# Patient Record
Sex: FEMALE | Race: ASIAN | NOT HISPANIC OR LATINO | URBAN - METROPOLITAN AREA
[De-identification: names, ages, dates, MRNs, and addresses within clinical notes are randomized per-mention and may not be internally consistent; named-entity substitution may affect disease eponyms.]

---

## 2020-09-16 ENCOUNTER — EMERGENCY (EMERGENCY)
Facility: HOSPITAL | Age: 45
LOS: 1 days | Discharge: HOME | End: 2020-09-16
Attending: EMERGENCY MEDICINE
Payer: COMMERCIAL

## 2020-09-16 VITALS
HEART RATE: 66 BPM | OXYGEN SATURATION: 100 % | SYSTOLIC BLOOD PRESSURE: 112 MMHG | DIASTOLIC BLOOD PRESSURE: 69 MMHG | RESPIRATION RATE: 18 BRPM | TEMPERATURE: 98 F

## 2020-09-16 VITALS
HEART RATE: 78 BPM | WEIGHT: 128.09 LBS | SYSTOLIC BLOOD PRESSURE: 126 MMHG | TEMPERATURE: 97 F | RESPIRATION RATE: 18 BRPM | DIASTOLIC BLOOD PRESSURE: 74 MMHG | OXYGEN SATURATION: 99 %

## 2020-09-16 DIAGNOSIS — Z90.710 ACQUIRED ABSENCE OF BOTH CERVIX AND UTERUS: Chronic | ICD-10-CM

## 2020-09-16 LAB
ALBUMIN SERPL ELPH-MCNC: 4 G/DL — SIGNIFICANT CHANGE UP (ref 3.5–5)
ALP SERPL-CCNC: 79 U/L — SIGNIFICANT CHANGE UP (ref 40–120)
ALT FLD-CCNC: 27 U/L DA — SIGNIFICANT CHANGE UP (ref 10–60)
ANION GAP SERPL CALC-SCNC: 8 MMOL/L — SIGNIFICANT CHANGE UP (ref 5–17)
APTT BLD: 30.9 SEC — SIGNIFICANT CHANGE UP (ref 27.5–35.5)
AST SERPL-CCNC: 43 U/L — HIGH (ref 10–40)
BASOPHILS # BLD AUTO: 0.02 K/UL — SIGNIFICANT CHANGE UP (ref 0–0.2)
BASOPHILS NFR BLD AUTO: 0.3 % — SIGNIFICANT CHANGE UP (ref 0–2)
BILIRUB SERPL-MCNC: 0.4 MG/DL — SIGNIFICANT CHANGE UP (ref 0.2–1.2)
BUN SERPL-MCNC: 20 MG/DL — HIGH (ref 7–18)
CALCIUM SERPL-MCNC: 8.6 MG/DL — SIGNIFICANT CHANGE UP (ref 8.4–10.5)
CHLORIDE SERPL-SCNC: 111 MMOL/L — HIGH (ref 96–108)
CO2 SERPL-SCNC: 21 MMOL/L — LOW (ref 22–31)
CREAT SERPL-MCNC: 1.27 MG/DL — SIGNIFICANT CHANGE UP (ref 0.5–1.3)
EOSINOPHIL # BLD AUTO: 0.24 K/UL — SIGNIFICANT CHANGE UP (ref 0–0.5)
EOSINOPHIL NFR BLD AUTO: 4 % — SIGNIFICANT CHANGE UP (ref 0–6)
GLUCOSE SERPL-MCNC: 109 MG/DL — HIGH (ref 70–99)
HCT VFR BLD CALC: 27.4 % — LOW (ref 34.5–45)
HGB BLD-MCNC: 7.4 G/DL — LOW (ref 11.5–15.5)
IMM GRANULOCYTES NFR BLD AUTO: 0.3 % — SIGNIFICANT CHANGE UP (ref 0–1.5)
INR BLD: 1.08 RATIO — SIGNIFICANT CHANGE UP (ref 0.88–1.16)
LYMPHOCYTES # BLD AUTO: 1.31 K/UL — SIGNIFICANT CHANGE UP (ref 1–3.3)
LYMPHOCYTES # BLD AUTO: 21.7 % — SIGNIFICANT CHANGE UP (ref 13–44)
MCHC RBC-ENTMCNC: 19.8 PG — LOW (ref 27–34)
MCHC RBC-ENTMCNC: 27 GM/DL — LOW (ref 32–36)
MCV RBC AUTO: 73.5 FL — LOW (ref 80–100)
MONOCYTES # BLD AUTO: 0.66 K/UL — SIGNIFICANT CHANGE UP (ref 0–0.9)
MONOCYTES NFR BLD AUTO: 10.9 % — SIGNIFICANT CHANGE UP (ref 2–14)
NEUTROPHILS # BLD AUTO: 3.79 K/UL — SIGNIFICANT CHANGE UP (ref 1.8–7.4)
NEUTROPHILS NFR BLD AUTO: 62.8 % — SIGNIFICANT CHANGE UP (ref 43–77)
NRBC # BLD: 0 /100 WBCS — SIGNIFICANT CHANGE UP (ref 0–0)
PLATELET # BLD AUTO: 408 K/UL — HIGH (ref 150–400)
POTASSIUM SERPL-MCNC: 4.2 MMOL/L — SIGNIFICANT CHANGE UP (ref 3.5–5.3)
POTASSIUM SERPL-SCNC: 4.2 MMOL/L — SIGNIFICANT CHANGE UP (ref 3.5–5.3)
PROT SERPL-MCNC: 8.3 G/DL — SIGNIFICANT CHANGE UP (ref 6–8.3)
PROTHROM AB SERPL-ACNC: 12.6 SEC — SIGNIFICANT CHANGE UP (ref 10.6–13.6)
RBC # BLD: 3.73 M/UL — LOW (ref 3.8–5.2)
RBC # FLD: 18.5 % — HIGH (ref 10.3–14.5)
SODIUM SERPL-SCNC: 140 MMOL/L — SIGNIFICANT CHANGE UP (ref 135–145)
WBC # BLD: 6.04 K/UL — SIGNIFICANT CHANGE UP (ref 3.8–10.5)
WBC # FLD AUTO: 6.04 K/UL — SIGNIFICANT CHANGE UP (ref 3.8–10.5)

## 2020-09-16 PROCEDURE — 86901 BLOOD TYPING SEROLOGIC RH(D): CPT

## 2020-09-16 PROCEDURE — 99285 EMERGENCY DEPT VISIT HI MDM: CPT | Mod: 25

## 2020-09-16 PROCEDURE — 86923 COMPATIBILITY TEST ELECTRIC: CPT

## 2020-09-16 PROCEDURE — 36430 TRANSFUSION BLD/BLD COMPNT: CPT

## 2020-09-16 PROCEDURE — 36415 COLL VENOUS BLD VENIPUNCTURE: CPT

## 2020-09-16 PROCEDURE — 85610 PROTHROMBIN TIME: CPT

## 2020-09-16 PROCEDURE — 80053 COMPREHEN METABOLIC PANEL: CPT

## 2020-09-16 PROCEDURE — 85730 THROMBOPLASTIN TIME PARTIAL: CPT

## 2020-09-16 PROCEDURE — 85025 COMPLETE CBC W/AUTO DIFF WBC: CPT

## 2020-09-16 PROCEDURE — 86850 RBC ANTIBODY SCREEN: CPT

## 2020-09-16 PROCEDURE — P9040: CPT

## 2020-09-16 PROCEDURE — 86900 BLOOD TYPING SEROLOGIC ABO: CPT

## 2020-09-16 NOTE — ED PROVIDER NOTE - CLINICAL SUMMARY MEDICAL DECISION MAKING FREE TEXT BOX
Pt with lightheadedness and sent in for blood transfusion with anemia seen on outpatient labs--will recheck labs and transfuse as necessary.

## 2020-09-16 NOTE — ED PROVIDER NOTE - ATTENDING CONTRIBUTION TO CARE
I conducted a face-to-face interaction with patient and I agree with resident documentation and plan.  Pt sent by PMD for blood transfusion, with symptomatic anemia  Exam:  General:  NAD, AAO x 3  Heart:  RRR and Nl S1/S2  Lungs:  CTAB  Neuro: normal motor/sensory  A/P:  Moderate anemia, no active bleeding, transfused 1 unit PRBC and doing well; Advised strict return precautions and PMD f/u.

## 2020-09-16 NOTE — ED PROVIDER NOTE - OBJECTIVE STATEMENT
Patient is 45 year old F hx of Migraine on Topomax 25mg bid, fibroid Shx C section, hysterectomy with b/l oophorectomy came to ED for low hemoglobin.   Per pt she had blood work with her PMD 2 days ago she was found to have hb 6.9 hematocrit 25.5 and mcv 75. So her PMD send her to ED for blood transfusion. Pt states she is feeling very weak, lightheaded for last 2-3 weeks, she had an syncopal episode 2 weeks ago when she was washing dishes, episode lasted for 2 min, her  held so she did not have fall. Pt states she went to see her PMD after that and she had blood work. Pt states she has out pt f/u with cardiologist and neurologist in next week. Pt had egd/coloscopy in february with Dr. Eusebio León and found to have gastritis and internal hemorroids. Pt states she was supposed to see her gastroenterologist in June but could not see because of pandemic but now she is gonna she gastro soon.   Allergy: NKDA  Social: works as nurse, denies smoking, etoh, substance use

## 2020-09-16 NOTE — ED ADULT NURSE NOTE - OBJECTIVE STATEMENT
presents for evaluation due to low H/H. Pt AxOx4 +ambulatoty denies pain dizziness SOB, reports lightheadedness.

## 2020-09-16 NOTE — ED PROVIDER NOTE - PHYSICAL EXAMINATION
PHYSICAL EXAM:  GENERAL: NAD, well-developed, pale  HEAD:  Atraumatic, Normocephalic  EYES: EOMI, PERRLA, conjunctiva and sclera clear  NECK: Supple, No JVD  CHEST/LUNG: Clear to auscultation bilaterally; No wheeze  HEART: Regular rate and rhythm; s1+ s2+  ABDOMEN: Soft, Nontender, Nondistended; Bowel sounds present  EXTREMITIES:, No clubbing, cyanosis, or edema  NEUROLOGY:AAOx3 non-focal  SKIN: No rashes or lesions

## 2020-09-16 NOTE — ED PROVIDER NOTE - NS ED ROS FT
REVIEW OF SYSTEMS:    CONSTITUTIONAL: +weakness, No fevers or chills  EYES/ENT: No visual changes;  No vertigo or throat pain   NECK: No pain or stiffness  RESPIRATORY: No cough, wheezing, hemoptysis; No shortness of breath  CARDIOVASCULAR: No chest pain or palpitations, + dyspnea on exertion   GASTROINTESTINAL: No abdominal or epigastric pain. No nausea, vomiting, or hematemesis; No diarrhea or constipation. No melena or hematochezia.  GENITOURINARY: No dysuria, frequency or hematuria  NEUROLOGICAL: No numbness or weakness  SKIN: No itching, burning, rashes, or lesions   All other review of systems is negative unless indicated above.

## 2020-09-16 NOTE — ED PROVIDER NOTE - PATIENT PORTAL LINK FT
You can access the FollowMyHealth Patient Portal offered by [facility] by registering at the following website: [email or web address]. By joining [employer]’s FollowMyHealth portal, you will also be able to view your health information using other applications (apps) compatible with our system.

## 2021-09-14 ENCOUNTER — INPATIENT (INPATIENT)
Facility: HOSPITAL | Age: 46
LOS: 2 days | Discharge: HOME | DRG: 349 | End: 2021-09-17
Attending: SURGERY | Admitting: SURGERY
Payer: COMMERCIAL

## 2021-09-14 ENCOUNTER — TRANSCRIPTION ENCOUNTER (OUTPATIENT)
Age: 46
End: 2021-09-14

## 2021-09-14 VITALS
HEART RATE: 98 BPM | DIASTOLIC BLOOD PRESSURE: 82 MMHG | RESPIRATION RATE: 18 BRPM | SYSTOLIC BLOOD PRESSURE: 127 MMHG | WEIGHT: 123.46 LBS | TEMPERATURE: 98 F | OXYGEN SATURATION: 98 %

## 2021-09-14 DIAGNOSIS — Z90.710 ACQUIRED ABSENCE OF BOTH CERVIX AND UTERUS: Chronic | ICD-10-CM

## 2021-09-14 LAB
ALBUMIN SERPL ELPH-MCNC: 3.1 G/DL — LOW (ref 3.5–5)
ALP SERPL-CCNC: 140 U/L — HIGH (ref 40–120)
ALT FLD-CCNC: 54 U/L DA — SIGNIFICANT CHANGE UP (ref 10–60)
ANION GAP SERPL CALC-SCNC: 7 MMOL/L — SIGNIFICANT CHANGE UP (ref 5–17)
AST SERPL-CCNC: 22 U/L — SIGNIFICANT CHANGE UP (ref 10–40)
BASOPHILS # BLD AUTO: 0.03 K/UL — SIGNIFICANT CHANGE UP (ref 0–0.2)
BASOPHILS NFR BLD AUTO: 0.3 % — SIGNIFICANT CHANGE UP (ref 0–2)
BILIRUB SERPL-MCNC: 0.4 MG/DL — SIGNIFICANT CHANGE UP (ref 0.2–1.2)
BUN SERPL-MCNC: 9 MG/DL — SIGNIFICANT CHANGE UP (ref 7–18)
CALCIUM SERPL-MCNC: 8.3 MG/DL — LOW (ref 8.4–10.5)
CHLORIDE SERPL-SCNC: 109 MMOL/L — HIGH (ref 96–108)
CO2 SERPL-SCNC: 24 MMOL/L — SIGNIFICANT CHANGE UP (ref 22–31)
CREAT SERPL-MCNC: 0.97 MG/DL — SIGNIFICANT CHANGE UP (ref 0.5–1.3)
EOSINOPHIL # BLD AUTO: 0.19 K/UL — SIGNIFICANT CHANGE UP (ref 0–0.5)
EOSINOPHIL NFR BLD AUTO: 1.7 % — SIGNIFICANT CHANGE UP (ref 0–6)
GLUCOSE SERPL-MCNC: 99 MG/DL — SIGNIFICANT CHANGE UP (ref 70–99)
HCG SERPL-ACNC: <1 MIU/ML — SIGNIFICANT CHANGE UP
HCT VFR BLD CALC: 35.9 % — SIGNIFICANT CHANGE UP (ref 34.5–45)
HGB BLD-MCNC: 11.6 G/DL — SIGNIFICANT CHANGE UP (ref 11.5–15.5)
IMM GRANULOCYTES NFR BLD AUTO: 0.4 % — SIGNIFICANT CHANGE UP (ref 0–1.5)
LACTATE SERPL-SCNC: 0.8 MMOL/L — SIGNIFICANT CHANGE UP (ref 0.7–2)
LYMPHOCYTES # BLD AUTO: 1.59 K/UL — SIGNIFICANT CHANGE UP (ref 1–3.3)
LYMPHOCYTES # BLD AUTO: 13.8 % — SIGNIFICANT CHANGE UP (ref 13–44)
MCHC RBC-ENTMCNC: 28.5 PG — SIGNIFICANT CHANGE UP (ref 27–34)
MCHC RBC-ENTMCNC: 32.3 GM/DL — SIGNIFICANT CHANGE UP (ref 32–36)
MCV RBC AUTO: 88.2 FL — SIGNIFICANT CHANGE UP (ref 80–100)
MONOCYTES # BLD AUTO: 1.35 K/UL — HIGH (ref 0–0.9)
MONOCYTES NFR BLD AUTO: 11.7 % — SIGNIFICANT CHANGE UP (ref 2–14)
NEUTROPHILS # BLD AUTO: 8.3 K/UL — HIGH (ref 1.8–7.4)
NEUTROPHILS NFR BLD AUTO: 72.1 % — SIGNIFICANT CHANGE UP (ref 43–77)
NRBC # BLD: 0 /100 WBCS — SIGNIFICANT CHANGE UP (ref 0–0)
PLATELET # BLD AUTO: 256 K/UL — SIGNIFICANT CHANGE UP (ref 150–400)
POTASSIUM SERPL-MCNC: 2.9 MMOL/L — CRITICAL LOW (ref 3.5–5.3)
POTASSIUM SERPL-SCNC: 2.9 MMOL/L — CRITICAL LOW (ref 3.5–5.3)
PROT SERPL-MCNC: 7.3 G/DL — SIGNIFICANT CHANGE UP (ref 6–8.3)
RBC # BLD: 4.07 M/UL — SIGNIFICANT CHANGE UP (ref 3.8–5.2)
RBC # FLD: 14 % — SIGNIFICANT CHANGE UP (ref 10.3–14.5)
SARS-COV-2 RNA SPEC QL NAA+PROBE: SIGNIFICANT CHANGE UP
SODIUM SERPL-SCNC: 140 MMOL/L — SIGNIFICANT CHANGE UP (ref 135–145)
WBC # BLD: 11.51 K/UL — HIGH (ref 3.8–10.5)
WBC # FLD AUTO: 11.51 K/UL — HIGH (ref 3.8–10.5)

## 2021-09-14 RX ORDER — POTASSIUM CHLORIDE 20 MEQ
40 PACKET (EA) ORAL EVERY 4 HOURS
Refills: 0 | Status: COMPLETED | OUTPATIENT
Start: 2021-09-14 | End: 2021-09-15

## 2021-09-14 RX ORDER — VANCOMYCIN HCL 1 G
1000 VIAL (EA) INTRAVENOUS ONCE
Refills: 0 | Status: COMPLETED | OUTPATIENT
Start: 2021-09-14 | End: 2021-09-14

## 2021-09-14 NOTE — ED PROVIDER NOTE - CARE PLAN
1 Principal Discharge DX:	Cellulitis   Principal Discharge DX:	Cellulitis  Secondary Diagnosis:	Abscess, perirectal

## 2021-09-14 NOTE — ED PROVIDER NOTE - PROGRESS NOTE DETAILS
Seth DO: CT reported 1. Approximately 5 cm perianal abscess extends into the medial left buttock. An underlying ano-cutaneous fistula is not definitively seen but cannot be excluded based on CT.  2. Additional complex cystic mass or collection in the right semimembranosus muscle, measures up to 3.7 cm. This is indeterminate, could represent degenerating hematoma, lymphocele, or soft tissue tumor.  Surgical HO endorsed, will evaluate pt.

## 2021-09-14 NOTE — ED PROVIDER NOTE - CLINICAL SUMMARY MEDICAL DECISION MAKING FREE TEXT BOX
46-year-old female hx of migraines, anemia, presenting with L buttock abscess x 5-6 days - will check labs, CT pelvis, and admit for IV antibiotics.

## 2021-09-14 NOTE — ED PROVIDER NOTE - OBJECTIVE STATEMENT
46-year-old female hx of migraines, anemia, presenting with L buttock abscess x 5-6 days. Has also had fevers to 100.5F daily. Went to Urgent Care and was prescribed PO Clindamycin which she has been taking, but states the site is getting more and more painful. Denies any other symptoms.

## 2021-09-15 DIAGNOSIS — L03.90 CELLULITIS, UNSPECIFIED: ICD-10-CM

## 2021-09-15 PROBLEM — G43.909 MIGRAINE, UNSPECIFIED, NOT INTRACTABLE, WITHOUT STATUS MIGRAINOSUS: Chronic | Status: ACTIVE | Noted: 2020-09-16

## 2021-09-15 PROBLEM — K64.8 OTHER HEMORRHOIDS: Chronic | Status: ACTIVE | Noted: 2020-09-16

## 2021-09-15 LAB
ANION GAP SERPL CALC-SCNC: 8 MMOL/L — SIGNIFICANT CHANGE UP (ref 5–17)
APTT BLD: 34.2 SEC — SIGNIFICANT CHANGE UP (ref 27.5–35.5)
BASOPHILS # BLD AUTO: 0.02 K/UL — SIGNIFICANT CHANGE UP (ref 0–0.2)
BASOPHILS NFR BLD AUTO: 0.2 % — SIGNIFICANT CHANGE UP (ref 0–2)
BLD GP AB SCN SERPL QL: SIGNIFICANT CHANGE UP
BUN SERPL-MCNC: 5 MG/DL — LOW (ref 7–18)
CALCIUM SERPL-MCNC: 8.5 MG/DL — SIGNIFICANT CHANGE UP (ref 8.4–10.5)
CHLORIDE SERPL-SCNC: 112 MMOL/L — HIGH (ref 96–108)
CO2 SERPL-SCNC: 23 MMOL/L — SIGNIFICANT CHANGE UP (ref 22–31)
CREAT SERPL-MCNC: 0.74 MG/DL — SIGNIFICANT CHANGE UP (ref 0.5–1.3)
EOSINOPHIL # BLD AUTO: 0.11 K/UL — SIGNIFICANT CHANGE UP (ref 0–0.5)
EOSINOPHIL NFR BLD AUTO: 0.9 % — SIGNIFICANT CHANGE UP (ref 0–6)
GLUCOSE SERPL-MCNC: 91 MG/DL — SIGNIFICANT CHANGE UP (ref 70–99)
HCT VFR BLD CALC: 34.5 % — SIGNIFICANT CHANGE UP (ref 34.5–45)
HGB BLD-MCNC: 11 G/DL — LOW (ref 11.5–15.5)
IMM GRANULOCYTES NFR BLD AUTO: 0.4 % — SIGNIFICANT CHANGE UP (ref 0–1.5)
INR BLD: 1.18 RATIO — HIGH (ref 0.88–1.16)
LYMPHOCYTES # BLD AUTO: 1.05 K/UL — SIGNIFICANT CHANGE UP (ref 1–3.3)
LYMPHOCYTES # BLD AUTO: 8.9 % — LOW (ref 13–44)
MAGNESIUM SERPL-MCNC: 2.3 MG/DL — SIGNIFICANT CHANGE UP (ref 1.6–2.6)
MCHC RBC-ENTMCNC: 28.2 PG — SIGNIFICANT CHANGE UP (ref 27–34)
MCHC RBC-ENTMCNC: 31.9 GM/DL — LOW (ref 32–36)
MCV RBC AUTO: 88.5 FL — SIGNIFICANT CHANGE UP (ref 80–100)
MONOCYTES # BLD AUTO: 1.36 K/UL — HIGH (ref 0–0.9)
MONOCYTES NFR BLD AUTO: 11.5 % — SIGNIFICANT CHANGE UP (ref 2–14)
NEUTROPHILS # BLD AUTO: 9.21 K/UL — HIGH (ref 1.8–7.4)
NEUTROPHILS NFR BLD AUTO: 78.1 % — HIGH (ref 43–77)
NRBC # BLD: 0 /100 WBCS — SIGNIFICANT CHANGE UP (ref 0–0)
PHOSPHATE SERPL-MCNC: 2.9 MG/DL — SIGNIFICANT CHANGE UP (ref 2.5–4.5)
PLATELET # BLD AUTO: 247 K/UL — SIGNIFICANT CHANGE UP (ref 150–400)
POTASSIUM SERPL-MCNC: 3.2 MMOL/L — LOW (ref 3.5–5.3)
POTASSIUM SERPL-SCNC: 3.2 MMOL/L — LOW (ref 3.5–5.3)
PROTHROM AB SERPL-ACNC: 13.9 SEC — HIGH (ref 10.6–13.6)
RBC # BLD: 3.9 M/UL — SIGNIFICANT CHANGE UP (ref 3.8–5.2)
RBC # FLD: 14.1 % — SIGNIFICANT CHANGE UP (ref 10.3–14.5)
SODIUM SERPL-SCNC: 143 MMOL/L — SIGNIFICANT CHANGE UP (ref 135–145)
WBC # BLD: 11.8 K/UL — HIGH (ref 3.8–10.5)
WBC # FLD AUTO: 11.8 K/UL — HIGH (ref 3.8–10.5)

## 2021-09-15 PROCEDURE — 99222 1ST HOSP IP/OBS MODERATE 55: CPT | Mod: 57,25

## 2021-09-15 PROCEDURE — 72193 CT PELVIS W/DYE: CPT | Mod: 26,MA

## 2021-09-15 PROCEDURE — 46275 REMOVE ANAL FIST INTER: CPT

## 2021-09-15 PROCEDURE — ZZZZZ: CPT

## 2021-09-15 RX ORDER — FENTANYL CITRATE 50 UG/ML
25 INJECTION INTRAVENOUS
Refills: 0 | Status: DISCONTINUED | OUTPATIENT
Start: 2021-09-15 | End: 2021-09-15

## 2021-09-15 RX ORDER — AMPICILLIN SODIUM AND SULBACTAM SODIUM 250; 125 MG/ML; MG/ML
3 INJECTION, POWDER, FOR SUSPENSION INTRAMUSCULAR; INTRAVENOUS ONCE
Refills: 0 | Status: COMPLETED | OUTPATIENT
Start: 2021-09-15 | End: 2021-09-15

## 2021-09-15 RX ORDER — HYDROMORPHONE HYDROCHLORIDE 2 MG/ML
0.5 INJECTION INTRAMUSCULAR; INTRAVENOUS; SUBCUTANEOUS
Refills: 0 | Status: DISCONTINUED | OUTPATIENT
Start: 2021-09-15 | End: 2021-09-15

## 2021-09-15 RX ORDER — ONDANSETRON 8 MG/1
4 TABLET, FILM COATED ORAL EVERY 6 HOURS
Refills: 0 | Status: DISCONTINUED | OUTPATIENT
Start: 2021-09-15 | End: 2021-09-15

## 2021-09-15 RX ORDER — HYDROMORPHONE HYDROCHLORIDE 2 MG/ML
0.5 INJECTION INTRAMUSCULAR; INTRAVENOUS; SUBCUTANEOUS EVERY 4 HOURS
Refills: 0 | Status: DISCONTINUED | OUTPATIENT
Start: 2021-09-15 | End: 2021-09-17

## 2021-09-15 RX ORDER — ENOXAPARIN SODIUM 100 MG/ML
40 INJECTION SUBCUTANEOUS DAILY
Refills: 0 | Status: DISCONTINUED | OUTPATIENT
Start: 2021-09-15 | End: 2021-09-15

## 2021-09-15 RX ORDER — DEXTROSE MONOHYDRATE, SODIUM CHLORIDE, AND POTASSIUM CHLORIDE 50; .745; 4.5 G/1000ML; G/1000ML; G/1000ML
1000 INJECTION, SOLUTION INTRAVENOUS
Refills: 0 | Status: DISCONTINUED | OUTPATIENT
Start: 2021-09-15 | End: 2021-09-15

## 2021-09-15 RX ORDER — FENTANYL CITRATE 50 UG/ML
50 INJECTION INTRAVENOUS
Refills: 0 | Status: DISCONTINUED | OUTPATIENT
Start: 2021-09-15 | End: 2021-09-15

## 2021-09-15 RX ORDER — ACETAMINOPHEN 500 MG
1000 TABLET ORAL ONCE
Refills: 0 | Status: COMPLETED | OUTPATIENT
Start: 2021-09-15 | End: 2021-09-15

## 2021-09-15 RX ORDER — KETOROLAC TROMETHAMINE 30 MG/ML
30 SYRINGE (ML) INJECTION EVERY 6 HOURS
Refills: 0 | Status: DISCONTINUED | OUTPATIENT
Start: 2021-09-15 | End: 2021-09-15

## 2021-09-15 RX ORDER — HYDROMORPHONE HYDROCHLORIDE 2 MG/ML
1 INJECTION INTRAMUSCULAR; INTRAVENOUS; SUBCUTANEOUS EVERY 4 HOURS
Refills: 0 | Status: DISCONTINUED | OUTPATIENT
Start: 2021-09-15 | End: 2021-09-15

## 2021-09-15 RX ORDER — ONDANSETRON 8 MG/1
4 TABLET, FILM COATED ORAL ONCE
Refills: 0 | Status: DISCONTINUED | OUTPATIENT
Start: 2021-09-15 | End: 2021-09-15

## 2021-09-15 RX ORDER — TOPIRAMATE 25 MG
1 TABLET ORAL
Qty: 0 | Refills: 0 | DISCHARGE

## 2021-09-15 RX ORDER — SODIUM CHLORIDE 9 MG/ML
1000 INJECTION, SOLUTION INTRAVENOUS ONCE
Refills: 0 | Status: COMPLETED | OUTPATIENT
Start: 2021-09-15 | End: 2021-09-15

## 2021-09-15 RX ORDER — ONDANSETRON 8 MG/1
4 TABLET, FILM COATED ORAL EVERY 6 HOURS
Refills: 0 | Status: DISCONTINUED | OUTPATIENT
Start: 2021-09-15 | End: 2021-09-17

## 2021-09-15 RX ORDER — AMPICILLIN SODIUM AND SULBACTAM SODIUM 250; 125 MG/ML; MG/ML
3 INJECTION, POWDER, FOR SUSPENSION INTRAMUSCULAR; INTRAVENOUS EVERY 6 HOURS
Refills: 0 | Status: DISCONTINUED | OUTPATIENT
Start: 2021-09-15 | End: 2021-09-15

## 2021-09-15 RX ORDER — AMPICILLIN SODIUM AND SULBACTAM SODIUM 250; 125 MG/ML; MG/ML
1.5 INJECTION, POWDER, FOR SUSPENSION INTRAMUSCULAR; INTRAVENOUS EVERY 6 HOURS
Refills: 0 | Status: DISCONTINUED | OUTPATIENT
Start: 2021-09-15 | End: 2021-09-17

## 2021-09-15 RX ORDER — AMPICILLIN SODIUM AND SULBACTAM SODIUM 250; 125 MG/ML; MG/ML
INJECTION, POWDER, FOR SUSPENSION INTRAMUSCULAR; INTRAVENOUS
Refills: 0 | Status: DISCONTINUED | OUTPATIENT
Start: 2021-09-15 | End: 2021-09-15

## 2021-09-15 RX ADMIN — AMPICILLIN SODIUM AND SULBACTAM SODIUM 3 GRAM(S): 250; 125 INJECTION, POWDER, FOR SUSPENSION INTRAMUSCULAR; INTRAVENOUS at 07:11

## 2021-09-15 RX ADMIN — AMPICILLIN SODIUM AND SULBACTAM SODIUM 100 GRAM(S): 250; 125 INJECTION, POWDER, FOR SUSPENSION INTRAMUSCULAR; INTRAVENOUS at 21:35

## 2021-09-15 RX ADMIN — AMPICILLIN SODIUM AND SULBACTAM SODIUM 200 GRAM(S): 250; 125 INJECTION, POWDER, FOR SUSPENSION INTRAMUSCULAR; INTRAVENOUS at 11:46

## 2021-09-15 RX ADMIN — Medication 1000 MILLIGRAM(S): at 16:25

## 2021-09-15 RX ADMIN — Medication 400 MILLIGRAM(S): at 16:12

## 2021-09-15 RX ADMIN — Medication 40 MILLIEQUIVALENT(S): at 00:10

## 2021-09-15 RX ADMIN — AMPICILLIN SODIUM AND SULBACTAM SODIUM 200 GRAM(S): 250; 125 INJECTION, POWDER, FOR SUSPENSION INTRAMUSCULAR; INTRAVENOUS at 06:41

## 2021-09-15 RX ADMIN — HYDROMORPHONE HYDROCHLORIDE 0.5 MILLIGRAM(S): 2 INJECTION INTRAMUSCULAR; INTRAVENOUS; SUBCUTANEOUS at 16:12

## 2021-09-15 RX ADMIN — HYDROMORPHONE HYDROCHLORIDE 0.5 MILLIGRAM(S): 2 INJECTION INTRAMUSCULAR; INTRAVENOUS; SUBCUTANEOUS at 16:25

## 2021-09-15 RX ADMIN — Medication 40 MILLIEQUIVALENT(S): at 06:41

## 2021-09-15 RX ADMIN — SODIUM CHLORIDE 1000 MILLILITER(S): 9 INJECTION, SOLUTION INTRAVENOUS at 06:41

## 2021-09-15 RX ADMIN — Medication 250 MILLIGRAM(S): at 00:10

## 2021-09-15 RX ADMIN — DEXTROSE MONOHYDRATE, SODIUM CHLORIDE, AND POTASSIUM CHLORIDE 100 MILLILITER(S): 50; .745; 4.5 INJECTION, SOLUTION INTRAVENOUS at 11:47

## 2021-09-15 NOTE — BRIEF OPERATIVE NOTE - NSICDXBRIEFPROCEDURE_GEN_ALL_CORE_FT
PROCEDURES:  Incision and drainage, abscess, perirectal, with fistulectomy 15-Sep-2021 15:50:04  Celina Crowley

## 2021-09-15 NOTE — H&P ADULT - NSHPLABSRESULTS_GEN_ALL_CORE
11.6   11.51 )-----------( 256      ( 14 Sep 2021 22:27 )             35.9   09-14    140  |  109<H>  |  9   ----------------------------<  99  2.9<LL>   |  24  |  0.97    Ca    8.3<L>      14 Sep 2021 22:27  Mg     2.1     09-14    TPro  7.3  /  Alb  3.1<L>  /  TBili  0.4  /  DBili  x   /  AST  22  /  ALT  54  /  AlkPhos  140<H>  09-14    < from: CT Pelvis w/ IV Cont (09.15.21 @ 01:32) >    FINDINGS:  Multiloculated peripherally enhancing fluid collection extends from the posterior perianal region into the medial left buttock overall measures 5.1 x 2.5 x 4.9 cm. Communication with the rectal lumen is not definitively identified on this scan. Prominent fat stranding in the subcutaneous tissues of the left buttock.    No subcutaneous emphysema or radiopaque foreign matter.    No periostitis or focal osseous destruction. No hip joint effusion.    Normal appendix.    3.7 x 2.1 x 5 cm mildly complex fluid collection or cystic lesion in the semimembranosus muscle of the right thigh.    Normal appendix. No free air or free fluid in the pelvis.    IMPRESSION:    1. Approximately 5 cm perianal abscess extends into the medial left buttock. An underlying ano-cutaneous fistula is not definitively seen but cannot be excluded based on CT.  2. Additional complex cystic mass or collection in the right semimembranosus muscle, measures up to 3.7 cm. This is indeterminate, couldrepresent degenerating hematoma, lymphocele, or soft tissue tumor.      < end of copied text >

## 2021-09-15 NOTE — H&P ADULT - HISTORY OF PRESENT ILLNESS
47y/o f with PMHx of Migraines, Anemia, presents to ED with rectal pain x 5days . Pain /mass is located in lt rectum described as sharp and persistent with associated subjective fever, nausea and 1 episode of NBNB vomiting but no relationship to defecation . Went to Urgent Care and was prescribed PO Clindamycin which she has been taking, but states the site is getting more and more painful. Denies any other symptoms including BRBPR, constipation, SOB, lightheadedness, palpitations or any other complaints at this time .

## 2021-09-15 NOTE — H&P ADULT - ASSESSMENT
45y/o f with PMHx of Migraines, Anemia, presents to ED with 5 x 5cm Perianal abscess close to the anal vulge. Presented Afebrile, Hypokalemic. Will require drainage in the OR    Admit to Dr Ad DE LA CRUZ, OBIEO   OR this AM -I&D of Perianal abscess  IV ABx   Pain/nausea mgmt   f/up labs and covid pcr  DVT PPx

## 2021-09-15 NOTE — H&P ADULT - NSHPPHYSICALEXAM_GEN_ALL_CORE
Vital Signs Last 24 Hrs  T(C): 36.7 (15 Sep 2021 03:30), Max: 36.8 (14 Sep 2021 20:36)  T(F): 98.1 (15 Sep 2021 03:30), Max: 98.2 (14 Sep 2021 20:36)  HR: 85 (15 Sep 2021 03:30) (85 - 98)  BP: 106/66 (15 Sep 2021 03:30) (106/66 - 127/82)  BP(mean): --  RR: 16 (15 Sep 2021 03:30) (16 - 18)  SpO2: 99% (15 Sep 2021 03:30) (98% - 99%)    General:  A&Ox3,Appears stated age, No acute distress,  Head: NC/AT  EENT: PERRLA. EOMI. Conjunctiva and sclera clear. Pharynx clear.  Neck: Supple. No JVD  Lungs: CTA B/l. Nonlabored Respirations  CV: +S1S2, RRR  Abdomen: Soft, Nondistended,  Nontender, no guarding, no rebound  Perineum: 2 x 3cm erythematous, tender and possibly flunctuant mass in 9'0 clock area of rectum about 1 cm to the anal vulge. TIKA: deferred 2/2 pain  Extremities: Warm and well perfused. 2+ peripheral pulses b/l. Calf soft, nontender b/l. No pedal edema.

## 2021-09-15 NOTE — BRIEF OPERATIVE NOTE - NSICDXBRIEFPOSTOP_GEN_ALL_CORE_FT
POST-OP DIAGNOSIS:  Perirectal abscess 15-Sep-2021 15:50:18  Celina Crowley  External hemorrhoids 15-Sep-2021 15:50:59  Celina Crowley

## 2021-09-16 LAB
ANION GAP SERPL CALC-SCNC: 7 MMOL/L — SIGNIFICANT CHANGE UP (ref 5–17)
BASOPHILS # BLD AUTO: 0.02 K/UL — SIGNIFICANT CHANGE UP (ref 0–0.2)
BASOPHILS NFR BLD AUTO: 0.2 % — SIGNIFICANT CHANGE UP (ref 0–2)
BUN SERPL-MCNC: 4 MG/DL — LOW (ref 7–18)
CALCIUM SERPL-MCNC: 8.1 MG/DL — LOW (ref 8.4–10.5)
CHLORIDE SERPL-SCNC: 112 MMOL/L — HIGH (ref 96–108)
CO2 SERPL-SCNC: 25 MMOL/L — SIGNIFICANT CHANGE UP (ref 22–31)
COVID-19 SPIKE DOMAIN AB INTERP: POSITIVE
COVID-19 SPIKE DOMAIN ANTIBODY RESULT: >250 U/ML — HIGH
CREAT SERPL-MCNC: 0.63 MG/DL — SIGNIFICANT CHANGE UP (ref 0.5–1.3)
EOSINOPHIL # BLD AUTO: 0.24 K/UL — SIGNIFICANT CHANGE UP (ref 0–0.5)
EOSINOPHIL NFR BLD AUTO: 2.6 % — SIGNIFICANT CHANGE UP (ref 0–6)
GLUCOSE SERPL-MCNC: 92 MG/DL — SIGNIFICANT CHANGE UP (ref 70–99)
HCT VFR BLD CALC: 30.9 % — LOW (ref 34.5–45)
HGB BLD-MCNC: 10 G/DL — LOW (ref 11.5–15.5)
IMM GRANULOCYTES NFR BLD AUTO: 0.8 % — SIGNIFICANT CHANGE UP (ref 0–1.5)
LYMPHOCYTES # BLD AUTO: 1.02 K/UL — SIGNIFICANT CHANGE UP (ref 1–3.3)
LYMPHOCYTES # BLD AUTO: 11.1 % — LOW (ref 13–44)
MCHC RBC-ENTMCNC: 29 PG — SIGNIFICANT CHANGE UP (ref 27–34)
MCHC RBC-ENTMCNC: 32.4 GM/DL — SIGNIFICANT CHANGE UP (ref 32–36)
MCV RBC AUTO: 89.6 FL — SIGNIFICANT CHANGE UP (ref 80–100)
MONOCYTES # BLD AUTO: 1 K/UL — HIGH (ref 0–0.9)
MONOCYTES NFR BLD AUTO: 10.9 % — SIGNIFICANT CHANGE UP (ref 2–14)
NEUTROPHILS # BLD AUTO: 6.84 K/UL — SIGNIFICANT CHANGE UP (ref 1.8–7.4)
NEUTROPHILS NFR BLD AUTO: 74.4 % — SIGNIFICANT CHANGE UP (ref 43–77)
NRBC # BLD: 0 /100 WBCS — SIGNIFICANT CHANGE UP (ref 0–0)
PLATELET # BLD AUTO: 216 K/UL — SIGNIFICANT CHANGE UP (ref 150–400)
POTASSIUM SERPL-MCNC: 3 MMOL/L — LOW (ref 3.5–5.3)
POTASSIUM SERPL-SCNC: 3 MMOL/L — LOW (ref 3.5–5.3)
RBC # BLD: 3.45 M/UL — LOW (ref 3.8–5.2)
RBC # FLD: 14.2 % — SIGNIFICANT CHANGE UP (ref 10.3–14.5)
SARS-COV-2 IGG+IGM SERPL QL IA: >250 U/ML — HIGH
SARS-COV-2 IGG+IGM SERPL QL IA: POSITIVE
SODIUM SERPL-SCNC: 144 MMOL/L — SIGNIFICANT CHANGE UP (ref 135–145)
WBC # BLD: 9.19 K/UL — SIGNIFICANT CHANGE UP (ref 3.8–10.5)
WBC # FLD AUTO: 9.19 K/UL — SIGNIFICANT CHANGE UP (ref 3.8–10.5)

## 2021-09-16 RX ORDER — POTASSIUM CHLORIDE 20 MEQ
40 PACKET (EA) ORAL EVERY 4 HOURS
Refills: 0 | Status: COMPLETED | OUTPATIENT
Start: 2021-09-16 | End: 2021-09-16

## 2021-09-16 RX ADMIN — AMPICILLIN SODIUM AND SULBACTAM SODIUM 100 GRAM(S): 250; 125 INJECTION, POWDER, FOR SUSPENSION INTRAMUSCULAR; INTRAVENOUS at 23:14

## 2021-09-16 RX ADMIN — AMPICILLIN SODIUM AND SULBACTAM SODIUM 100 GRAM(S): 250; 125 INJECTION, POWDER, FOR SUSPENSION INTRAMUSCULAR; INTRAVENOUS at 05:29

## 2021-09-16 RX ADMIN — AMPICILLIN SODIUM AND SULBACTAM SODIUM 100 GRAM(S): 250; 125 INJECTION, POWDER, FOR SUSPENSION INTRAMUSCULAR; INTRAVENOUS at 17:25

## 2021-09-16 RX ADMIN — AMPICILLIN SODIUM AND SULBACTAM SODIUM 100 GRAM(S): 250; 125 INJECTION, POWDER, FOR SUSPENSION INTRAMUSCULAR; INTRAVENOUS at 12:05

## 2021-09-16 RX ADMIN — Medication 40 MILLIEQUIVALENT(S): at 12:04

## 2021-09-16 RX ADMIN — HYDROMORPHONE HYDROCHLORIDE 0.5 MILLIGRAM(S): 2 INJECTION INTRAMUSCULAR; INTRAVENOUS; SUBCUTANEOUS at 07:46

## 2021-09-16 RX ADMIN — Medication 40 MILLIEQUIVALENT(S): at 17:24

## 2021-09-16 RX ADMIN — HYDROMORPHONE HYDROCHLORIDE 0.5 MILLIGRAM(S): 2 INJECTION INTRAMUSCULAR; INTRAVENOUS; SUBCUTANEOUS at 18:00

## 2021-09-16 RX ADMIN — HYDROMORPHONE HYDROCHLORIDE 0.5 MILLIGRAM(S): 2 INJECTION INTRAMUSCULAR; INTRAVENOUS; SUBCUTANEOUS at 17:27

## 2021-09-16 RX ADMIN — HYDROMORPHONE HYDROCHLORIDE 0.5 MILLIGRAM(S): 2 INJECTION INTRAMUSCULAR; INTRAVENOUS; SUBCUTANEOUS at 08:16

## 2021-09-17 ENCOUNTER — TRANSCRIPTION ENCOUNTER (OUTPATIENT)
Age: 46
End: 2021-09-17

## 2021-09-17 VITALS
OXYGEN SATURATION: 100 % | HEART RATE: 81 BPM | RESPIRATION RATE: 17 BRPM | SYSTOLIC BLOOD PRESSURE: 113 MMHG | DIASTOLIC BLOOD PRESSURE: 69 MMHG | TEMPERATURE: 98 F

## 2021-09-17 LAB
-  AMIKACIN: SIGNIFICANT CHANGE UP
-  AMIKACIN: SIGNIFICANT CHANGE UP
-  AMOXICILLIN/CLAVULANIC ACID: SIGNIFICANT CHANGE UP
-  AMOXICILLIN/CLAVULANIC ACID: SIGNIFICANT CHANGE UP
-  AMPICILLIN/SULBACTAM: SIGNIFICANT CHANGE UP
-  AMPICILLIN/SULBACTAM: SIGNIFICANT CHANGE UP
-  AMPICILLIN: SIGNIFICANT CHANGE UP
-  AMPICILLIN: SIGNIFICANT CHANGE UP
-  AZTREONAM: SIGNIFICANT CHANGE UP
-  AZTREONAM: SIGNIFICANT CHANGE UP
-  CEFAZOLIN: SIGNIFICANT CHANGE UP
-  CEFAZOLIN: SIGNIFICANT CHANGE UP
-  CEFEPIME: SIGNIFICANT CHANGE UP
-  CEFEPIME: SIGNIFICANT CHANGE UP
-  CEFOXITIN: SIGNIFICANT CHANGE UP
-  CEFOXITIN: SIGNIFICANT CHANGE UP
-  CEFTRIAXONE: SIGNIFICANT CHANGE UP
-  CEFTRIAXONE: SIGNIFICANT CHANGE UP
-  CIPROFLOXACIN: SIGNIFICANT CHANGE UP
-  CIPROFLOXACIN: SIGNIFICANT CHANGE UP
-  ERTAPENEM: SIGNIFICANT CHANGE UP
-  ERTAPENEM: SIGNIFICANT CHANGE UP
-  GENTAMICIN: SIGNIFICANT CHANGE UP
-  GENTAMICIN: SIGNIFICANT CHANGE UP
-  IMIPENEM: SIGNIFICANT CHANGE UP
-  LEVOFLOXACIN: SIGNIFICANT CHANGE UP
-  LEVOFLOXACIN: SIGNIFICANT CHANGE UP
-  MEROPENEM: SIGNIFICANT CHANGE UP
-  MEROPENEM: SIGNIFICANT CHANGE UP
-  PIPERACILLIN/TAZOBACTAM: SIGNIFICANT CHANGE UP
-  PIPERACILLIN/TAZOBACTAM: SIGNIFICANT CHANGE UP
-  TOBRAMYCIN: SIGNIFICANT CHANGE UP
-  TOBRAMYCIN: SIGNIFICANT CHANGE UP
-  TRIMETHOPRIM/SULFAMETHOXAZOLE: SIGNIFICANT CHANGE UP
-  TRIMETHOPRIM/SULFAMETHOXAZOLE: SIGNIFICANT CHANGE UP
ANION GAP SERPL CALC-SCNC: 7 MMOL/L — SIGNIFICANT CHANGE UP (ref 5–17)
BUN SERPL-MCNC: 5 MG/DL — LOW (ref 7–18)
CALCIUM SERPL-MCNC: 8 MG/DL — LOW (ref 8.4–10.5)
CHLORIDE SERPL-SCNC: 111 MMOL/L — HIGH (ref 96–108)
CO2 SERPL-SCNC: 25 MMOL/L — SIGNIFICANT CHANGE UP (ref 22–31)
CREAT SERPL-MCNC: 0.64 MG/DL — SIGNIFICANT CHANGE UP (ref 0.5–1.3)
CULTURE RESULTS: SIGNIFICANT CHANGE UP
GLUCOSE SERPL-MCNC: 91 MG/DL — SIGNIFICANT CHANGE UP (ref 70–99)
HCT VFR BLD CALC: 32.3 % — LOW (ref 34.5–45)
HGB BLD-MCNC: 10.3 G/DL — LOW (ref 11.5–15.5)
MAGNESIUM SERPL-MCNC: 2.5 MG/DL — SIGNIFICANT CHANGE UP (ref 1.6–2.6)
MCHC RBC-ENTMCNC: 28.6 PG — SIGNIFICANT CHANGE UP (ref 27–34)
MCHC RBC-ENTMCNC: 31.9 GM/DL — LOW (ref 32–36)
MCV RBC AUTO: 89.7 FL — SIGNIFICANT CHANGE UP (ref 80–100)
METHOD TYPE: SIGNIFICANT CHANGE UP
METHOD TYPE: SIGNIFICANT CHANGE UP
NRBC # BLD: 0 /100 WBCS — SIGNIFICANT CHANGE UP (ref 0–0)
ORGANISM # SPEC MICROSCOPIC CNT: SIGNIFICANT CHANGE UP
PHOSPHATE SERPL-MCNC: 2.9 MG/DL — SIGNIFICANT CHANGE UP (ref 2.5–4.5)
PLATELET # BLD AUTO: 235 K/UL — SIGNIFICANT CHANGE UP (ref 150–400)
POTASSIUM SERPL-MCNC: 3.5 MMOL/L — SIGNIFICANT CHANGE UP (ref 3.5–5.3)
POTASSIUM SERPL-SCNC: 3.5 MMOL/L — SIGNIFICANT CHANGE UP (ref 3.5–5.3)
RBC # BLD: 3.6 M/UL — LOW (ref 3.8–5.2)
RBC # FLD: 14.2 % — SIGNIFICANT CHANGE UP (ref 10.3–14.5)
SODIUM SERPL-SCNC: 143 MMOL/L — SIGNIFICANT CHANGE UP (ref 135–145)
SPECIMEN SOURCE: SIGNIFICANT CHANGE UP
WBC # BLD: 5.98 K/UL — SIGNIFICANT CHANGE UP (ref 3.8–10.5)
WBC # FLD AUTO: 5.98 K/UL — SIGNIFICANT CHANGE UP (ref 3.8–10.5)

## 2021-09-17 PROCEDURE — 80053 COMPREHEN METABOLIC PANEL: CPT

## 2021-09-17 PROCEDURE — 87075 CULTR BACTERIA EXCEPT BLOOD: CPT

## 2021-09-17 PROCEDURE — 96374 THER/PROPH/DIAG INJ IV PUSH: CPT

## 2021-09-17 PROCEDURE — 84702 CHORIONIC GONADOTROPIN TEST: CPT

## 2021-09-17 PROCEDURE — 85730 THROMBOPLASTIN TIME PARTIAL: CPT

## 2021-09-17 PROCEDURE — 83605 ASSAY OF LACTIC ACID: CPT

## 2021-09-17 PROCEDURE — 85025 COMPLETE CBC W/AUTO DIFF WBC: CPT

## 2021-09-17 PROCEDURE — 93005 ELECTROCARDIOGRAM TRACING: CPT

## 2021-09-17 PROCEDURE — 85027 COMPLETE CBC AUTOMATED: CPT

## 2021-09-17 PROCEDURE — 86850 RBC ANTIBODY SCREEN: CPT

## 2021-09-17 PROCEDURE — 87635 SARS-COV-2 COVID-19 AMP PRB: CPT

## 2021-09-17 PROCEDURE — 87186 SC STD MICRODIL/AGAR DIL: CPT

## 2021-09-17 PROCEDURE — 83735 ASSAY OF MAGNESIUM: CPT

## 2021-09-17 PROCEDURE — 80048 BASIC METABOLIC PNL TOTAL CA: CPT

## 2021-09-17 PROCEDURE — C1889: CPT

## 2021-09-17 PROCEDURE — 86901 BLOOD TYPING SEROLOGIC RH(D): CPT

## 2021-09-17 PROCEDURE — 84100 ASSAY OF PHOSPHORUS: CPT

## 2021-09-17 PROCEDURE — 86769 SARS-COV-2 COVID-19 ANTIBODY: CPT

## 2021-09-17 PROCEDURE — 86900 BLOOD TYPING SEROLOGIC ABO: CPT

## 2021-09-17 PROCEDURE — 85610 PROTHROMBIN TIME: CPT

## 2021-09-17 PROCEDURE — 72193 CT PELVIS W/DYE: CPT | Mod: MA

## 2021-09-17 PROCEDURE — 87070 CULTURE OTHR SPECIMN AEROBIC: CPT

## 2021-09-17 PROCEDURE — 36415 COLL VENOUS BLD VENIPUNCTURE: CPT

## 2021-09-17 PROCEDURE — 87077 CULTURE AEROBIC IDENTIFY: CPT

## 2021-09-17 PROCEDURE — 87205 SMEAR GRAM STAIN: CPT

## 2021-09-17 PROCEDURE — 99285 EMERGENCY DEPT VISIT HI MDM: CPT | Mod: 25

## 2021-09-17 RX ORDER — CIPROFLOXACIN LACTATE 400MG/40ML
1 VIAL (ML) INTRAVENOUS
Qty: 14 | Refills: 0
Start: 2021-09-17 | End: 2021-09-23

## 2021-09-17 RX ORDER — OXYCODONE AND ACETAMINOPHEN 5; 325 MG/1; MG/1
1 TABLET ORAL
Qty: 8 | Refills: 0
Start: 2021-09-17 | End: 2021-09-18

## 2021-09-17 RX ORDER — METRONIDAZOLE 500 MG
1 TABLET ORAL
Qty: 21 | Refills: 0
Start: 2021-09-17 | End: 2021-09-23

## 2021-09-17 RX ORDER — MOXIFLOXACIN HYDROCHLORIDE TABLETS, 400 MG 400 MG/1
1 TABLET, FILM COATED ORAL
Qty: 14 | Refills: 0
Start: 2021-09-17 | End: 2021-09-23

## 2021-09-17 RX ADMIN — AMPICILLIN SODIUM AND SULBACTAM SODIUM 100 GRAM(S): 250; 125 INJECTION, POWDER, FOR SUSPENSION INTRAMUSCULAR; INTRAVENOUS at 06:16

## 2021-09-17 RX ADMIN — AMPICILLIN SODIUM AND SULBACTAM SODIUM 100 GRAM(S): 250; 125 INJECTION, POWDER, FOR SUSPENSION INTRAMUSCULAR; INTRAVENOUS at 12:36

## 2021-09-17 NOTE — PROGRESS NOTE ADULT - SUBJECTIVE AND OBJECTIVE BOX
Surgery Post-Op Note  Pre-Op Dx: Perirectal Abscess  Procedure: I&D of Perirectal abscess  Surgeon: Dr. Duong     Pt seen and examined at bedside.  No acute complaints. Denied chest pain, shortness of breathe, nausea/vomiting.  Ambulating  Pt states she has not voided yet.    MEDICATION:  ampicillin/sulbactam  IVPB 1.5 Gram(s) IV Intermittent every 6 hours    Vital Signs Last 24 Hrs  T(C): 37.1 (15 Sep 2021 17:42), Max: 37.1 (15 Sep 2021 17:42)  T(F): 98.8 (15 Sep 2021 17:42), Max: 98.8 (15 Sep 2021 17:42)  HR: 74 (15 Sep 2021 17:42) (70 - 98)  BP: 93/55 (15 Sep 2021 17:42) (93/55 - 127/82)  BP(mean): 68 (15 Sep 2021 16:08) (68 - 74)  RR: 18 (15 Sep 2021 17:42) (14 - 18)  SpO2: 99% (15 Sep 2021 17:42) (98% - 100%)    Physical Exam:   GEN: AAOx3, No acute distress  Buttock: Dressing c/d/i  Extremities: non edematous, no calf pain bilaterally    Drains/tubes:  09-15 @ 07:01  -  09-15 @ 18:27  --------------------------------------------------------  IN: 500 mL / OUT: 0 mL / NET: 500 mL  
INTERVAL HPI/OVERNIGHT EVENTS:  Pt resting comfortably. Had post op retention requiring straight cath.  Has voided since.  Tolerating pain with meds.    MEDICATIONS  (STANDING):  ampicillin/sulbactam  IVPB 1.5 Gram(s) IV Intermittent every 6 hours    MEDICATIONS  (PRN):  HYDROmorphone  Injectable 0.5 milliGRAM(s) IV Push every 4 hours PRN Severe Pain (7 - 10)  ondansetron Injectable 4 milliGRAM(s) IV Push every 6 hours PRN Nausea    Vital Signs Last 24 Hrs  T(C): 37.1 (16 Sep 2021 05:35), Max: 37.3 (16 Sep 2021 00:14)  T(F): 98.7 (16 Sep 2021 05:35), Max: 99.1 (16 Sep 2021 00:14)  HR: 69 (16 Sep 2021 05:35) (69 - 82)  BP: 95/62 (16 Sep 2021 05:35) (92/59 - 116/77)  BP(mean): 68 (15 Sep 2021 16:08) (68 - 74)  RR: 17 (16 Sep 2021 05:35) (14 - 18)  SpO2: 100% (16 Sep 2021 05:35) (98% - 100%)    Physical:  General: A&Ox3. NAD.  Pelvis: L perirectal area indurated, mildly erythematous. Penrose in place. No active drainage noted.    I&O's Detail    15 Sep 2021 07:01  -  16 Sep 2021 07:00  --------------------------------------------------------  IN:    IV PiggyBack: 500 mL  Total IN: 500 mL    OUT:  Total OUT: 0 mL    Total NET: 500 mL    LABS:                        10.0   9.19  )-----------( 216      ( 16 Sep 2021 07:03 )             30.9             09-16    144  |  112<H>  |  4<L>  ----------------------------<  92  3.0<L>   |  25  |  0.63    Ca    8.1<L>      16 Sep 2021 07:03  Phos  2.9     09-15  Mg     2.3     09-15    TPro  7.3  /  Alb  3.1<L>  /  TBili  0.4  /  DBili  x   /  AST  22  /  ALT  54  /  AlkPhos  140<H>  09-14    
INTERVAL HPI/OVERNIGHT EVENTS:    Pt seen and examined at bedside. Admits to incisional pain.   Overnight glez catheter inserted for urinary retention     Vital Signs Last 24 Hrs  T(C): 36.9 (17 Sep 2021 05:28), Max: 37.2 (16 Sep 2021 14:47)  T(F): 98.5 (17 Sep 2021 05:28), Max: 98.9 (16 Sep 2021 14:47)  HR: 79 (17 Sep 2021 05:28) (68 - 80)  BP: 101/81 (17 Sep 2021 05:28) (101/81 - 137/84)  BP(mean): --  RR: 17 (17 Sep 2021 05:28) (16 - 17)  SpO2: 100% (17 Sep 2021 05:28) (100% - 100%)  I&O's Detail    16 Sep 2021 07:01  -  17 Sep 2021 07:00  --------------------------------------------------------  IN:  Total IN: 0 mL    OUT:    Indwelling Catheter - Urethral (mL): 600 mL  Total OUT: 600 mL    Total NET: -600 mL    ampicillin/sulbactam  IVPB 1.5 Gram(s) IV Intermittent every 6 hours      Physical Exam  General: AAOx3, No acute distress  : Perirectal area surgical wound, packing removed, no active bleeding, penrose in place, dressing changed   Extremities: non edematous, no calf pain bilaterally        Labs:                        10.3   5.98  )-----------( 235      ( 17 Sep 2021 05:55 )             32.3     09-17    143  |  111<H>  |  5<L>  ----------------------------<  91  3.5   |  25  |  0.64    Ca    8.0<L>      17 Sep 2021 05:55  Phos  2.9     09-17  Mg     2.5     09-17

## 2021-09-17 NOTE — DISCHARGE NOTE PROVIDER - HOSPITAL COURSE
45y/o f with PMHx of Migraines, Anemia, presents to ED with rectal pain x 5days . Pain /mass is located in lt rectum described as sharp and persistent with associated subjective fever, nausea and 1 episode of NBNB vomiting but no relationship to defecation . Went to Urgent Care and was prescribed PO Clindamycin which she has been taking, but states the site is getting more and more painful. Denies any other symptoms including BRBPR, constipation, SOB, lightheadedness, palpitations or any other complaints at this time .      CT abd/pelvis done in ED and showed:  Multiloculated peripherally enhancing fluid collection extends from the posterior perianal region into the medial left buttock overall measures 5.1 x 2.5 x 4.9 cm    Pt admitted to surgical services under care of Dr Duong, underwent Incision and Drainage of left buttock abscess on 9/15, post op period complicated w/ urinary retention, glez catheter inserted. Pt to be discharged home with glez catheter leg bag, pt to follow up with Dr Duong in office on Monday 9/20/21 at 8AM for glez catheter removal.

## 2021-09-17 NOTE — DISCHARGE NOTE NURSING/CASE MANAGEMENT/SOCIAL WORK - NSDCPEFALRISK_GEN_ALL_CORE
For information on Fall & injury Prevention, visit [email or web address]

## 2021-09-17 NOTE — DISCHARGE NOTE PROVIDER - NSDCCPCAREPLAN_GEN_ALL_CORE_FT
PRINCIPAL DISCHARGE DIAGNOSIS  Diagnosis: Abscess, perirectal  Assessment and Plan of Treatment:       SECONDARY DISCHARGE DIAGNOSES  Diagnosis: Urinary retention  Assessment and Plan of Treatment: A Glez Catheter is a slender rubber tube inserted into the bladder to drain urine.  With the catheter in place, you do not have to use the toilet to urinate.   You will go home with the catheter connected to a leg bag. The leg bag will contain  the urine draining from the bladder, and should be emptied when it is one-half  full.  Please follow up with Dr Duong in office 9/20/21 at 8AM for glez catheter removal.

## 2021-09-17 NOTE — PROGRESS NOTE ADULT - ASSESSMENT
46y.o. Female with hypokalemia s/p I&D perirectal abscess, fistulotomy POD#2, urinary retention    -C/w glez catheter   -Daily Dressing changes   -Keep penrose in place  -IV abx  -Pain control prn  -OOB/ Ambulate   -DVT ppx  -Dc planning 
46y Female s/p I&D of perirectal abscess POD#0  Postop stable, VSS    -Regular diet  -Hold dvt prophylaxis given intraoperative bleeding  -Pain/nausea control PRN  -Incentive spirometer  -OOB/Ambulate  
46y.o. Female with hypokalemia s/p I&D perirectal abscess, fistulotomy POD#1    -Dressing changed at bedside.  -Keep penrose in place.  -con't abx  -Pain control prn  -OOB as tolerated  -DVT ppx    Hypokalemia  -PO repletion

## 2021-09-17 NOTE — DISCHARGE NOTE PROVIDER - NSDCCPTREATMENT_GEN_ALL_CORE_FT
PRINCIPAL PROCEDURE  Procedure: Incision and drainage, abscess, perirectal, with fistulectomy  Findings and Treatment: You have a Penrose placed in your wound, please do not remove. Perform daily dressing changes, cover wound with dry gauze, secure with tape.   Sitz bath 15 minutes three times daily. Take stool softener as needed. No heavy straining.

## 2021-09-17 NOTE — DISCHARGE NOTE PROVIDER - NSDCMRMEDTOKEN_GEN_ALL_CORE_FT
Cipro 500 mg oral tablet: 1 tab(s) orally every 12 hours   Endocet 5/325 oral tablet: 1 tab(s) orally every 6 hours, As Needed -for moderate pain MDD:4   Flagyl 500 mg oral tablet: 1 tab(s) orally 3 times a day   Topiramate ER (Eqv-Qudexy XR) 25 mg oral capsule, extended release: 1 cap(s) orally once a day

## 2021-09-17 NOTE — DISCHARGE NOTE NURSING/CASE MANAGEMENT/SOCIAL WORK - PATIENT PORTAL LINK FT
You can access the FollowMyHealth Patient Portal offered by [facility] by registering at the following website: [email or web address]. By joining [employer]’s FollowMyHealth portal, you will also be able to view your health information using other applications (apps) compatible with our system.

## 2021-09-17 NOTE — DISCHARGE NOTE PROVIDER - CARE PROVIDER_API CALL
Darshan Duong (MD)  Surgery  [address] [place], [place] [id number]  Phone: [phone]  Fax: [phone]  Follow Up Time:

## 2021-09-20 ENCOUNTER — APPOINTMENT (OUTPATIENT)
Dept: SURGERY | Facility: CLINIC | Age: 46
End: 2021-09-20
Payer: COMMERCIAL

## 2021-09-20 VITALS
SYSTOLIC BLOOD PRESSURE: 124 MMHG | DIASTOLIC BLOOD PRESSURE: 83 MMHG | OXYGEN SATURATION: 100 % | BODY MASS INDEX: 21.34 KG/M2 | TEMPERATURE: 97.1 F | WEIGHT: 125 LBS | HEIGHT: 64 IN | HEART RATE: 91 BPM

## 2021-09-20 PROBLEM — Z00.00 ENCOUNTER FOR PREVENTIVE HEALTH EXAMINATION: Status: ACTIVE | Noted: 2021-09-20

## 2021-09-20 PROCEDURE — 99024 POSTOP FOLLOW-UP VISIT: CPT

## 2021-09-20 NOTE — PLAN
[FreeTextEntry1] : d/c penrose\par D/c glez cath\par f/u in 3 weeks\par Continue po abx\par f/u in  2 -3 weeks

## 2021-09-20 NOTE — REASON FOR VISIT
[Post Op: _________] : a [unfilled] post op visit [FreeTextEntry1] : s/p drainage of a large perirectal abscess

## 2021-10-11 ENCOUNTER — APPOINTMENT (OUTPATIENT)
Dept: SURGERY | Facility: CLINIC | Age: 46
End: 2021-10-11
Payer: COMMERCIAL

## 2021-10-11 VITALS — TEMPERATURE: 97.9 F

## 2021-10-11 PROCEDURE — 99024 POSTOP FOLLOW-UP VISIT: CPT

## 2021-10-11 NOTE — HISTORY OF PRESENT ILLNESS
[de-identified] : BARBARA BARBER is a 46 year old female with PMhx of Migraines, Anemia of who presents in the office for postop visit. Patient was admitted to [facility] for rectal pain.  She underwent examination under anesthesia, drainage of perirectal abscess,  Anal fistulotomy on 09/15/2021

## 2021-10-11 NOTE — CONSULT LETTER
[Dear  ___] : Dear  [unfilled], [Courtesy Letter:] : I had the pleasure of seeing your patient, [unfilled], in my office today. [Consult Closing:] : Thank you very much for allowing me to participate in the care of this patient.  If you have any questions, please do not hesitate to contact me. [Sincerely,] : Sincerely, [FreeTextEntry3] : Dr Duong

## 2021-10-11 NOTE — REASON FOR VISIT
[Post Op: _________] : a [unfilled] post op visit [FreeTextEntry1] : examination under anesthesia. Drainage of perirectal abscess. Anal fistulotomy on 09/15/2021

## 2021-10-11 NOTE — ASSESSMENT
[FreeTextEntry1] : BARBARA BARBER is a 46 year old female who underwent a examination under anesthesia. Drainage of perirectal abscess. Anal fistulotomy on 09/15/2021

## 2021-10-11 NOTE — PLAN
[FreeTextEntry1] : The small granulation cauterized with silver nitrate\par Wound healing well\par F/u in 1 month

## 2021-10-25 ENCOUNTER — TRANSCRIPTION ENCOUNTER (OUTPATIENT)
Age: 46
End: 2021-10-25

## 2022-03-25 ENCOUNTER — NON-APPOINTMENT (OUTPATIENT)
Age: 47
End: 2022-03-25

## 2022-03-28 ENCOUNTER — APPOINTMENT (OUTPATIENT)
Dept: HEART AND VASCULAR | Facility: CLINIC | Age: 47
End: 2022-03-28

## 2022-12-22 ENCOUNTER — NON-APPOINTMENT (OUTPATIENT)
Age: 47
End: 2022-12-22

## 2022-12-26 ENCOUNTER — INPATIENT (INPATIENT)
Facility: HOSPITAL | Age: 47
LOS: 2 days | Discharge: HOME HEALTH | DRG: 395 | End: 2022-12-29
Attending: SPECIALIST | Admitting: SPECIALIST
Payer: MEDICAID

## 2022-12-26 VITALS
DIASTOLIC BLOOD PRESSURE: 79 MMHG | OXYGEN SATURATION: 100 % | TEMPERATURE: 99 F | WEIGHT: 121.25 LBS | HEART RATE: 98 BPM | RESPIRATION RATE: 18 BRPM | SYSTOLIC BLOOD PRESSURE: 130 MMHG

## 2022-12-26 DIAGNOSIS — Z90.710 ACQUIRED ABSENCE OF BOTH CERVIX AND UTERUS: Chronic | ICD-10-CM

## 2022-12-26 LAB
ALBUMIN SERPL ELPH-MCNC: 3.4 G/DL — LOW (ref 3.5–5)
ALP SERPL-CCNC: 89 U/L — SIGNIFICANT CHANGE UP (ref 40–120)
ALT FLD-CCNC: 36 U/L DA — SIGNIFICANT CHANGE UP (ref 10–60)
ANION GAP SERPL CALC-SCNC: 10 MMOL/L — SIGNIFICANT CHANGE UP (ref 5–17)
APTT BLD: 37 SEC — HIGH (ref 27.5–35.5)
AST SERPL-CCNC: 14 U/L — SIGNIFICANT CHANGE UP (ref 10–40)
BASOPHILS # BLD AUTO: 0.02 K/UL — SIGNIFICANT CHANGE UP (ref 0–0.2)
BASOPHILS NFR BLD AUTO: 0.2 % — SIGNIFICANT CHANGE UP (ref 0–2)
BILIRUB SERPL-MCNC: 0.5 MG/DL — SIGNIFICANT CHANGE UP (ref 0.2–1.2)
BLD GP AB SCN SERPL QL: SIGNIFICANT CHANGE UP
BUN SERPL-MCNC: 16 MG/DL — SIGNIFICANT CHANGE UP (ref 7–18)
CALCIUM SERPL-MCNC: 8.7 MG/DL — SIGNIFICANT CHANGE UP (ref 8.4–10.5)
CHLORIDE SERPL-SCNC: 113 MMOL/L — HIGH (ref 96–108)
CO2 SERPL-SCNC: 19 MMOL/L — LOW (ref 22–31)
CREAT SERPL-MCNC: 1.08 MG/DL — SIGNIFICANT CHANGE UP (ref 0.5–1.3)
EGFR: 65 ML/MIN/1.73M2 — SIGNIFICANT CHANGE UP
EOSINOPHIL # BLD AUTO: 0.09 K/UL — SIGNIFICANT CHANGE UP (ref 0–0.5)
EOSINOPHIL NFR BLD AUTO: 0.7 % — SIGNIFICANT CHANGE UP (ref 0–6)
GLUCOSE SERPL-MCNC: 124 MG/DL — HIGH (ref 70–99)
HCG SERPL-ACNC: <1 MIU/ML — SIGNIFICANT CHANGE UP
HCT VFR BLD CALC: 42.4 % — SIGNIFICANT CHANGE UP (ref 34.5–45)
HGB BLD-MCNC: 13.8 G/DL — SIGNIFICANT CHANGE UP (ref 11.5–15.5)
IMM GRANULOCYTES NFR BLD AUTO: 0.2 % — SIGNIFICANT CHANGE UP (ref 0–0.9)
INR BLD: 1.22 RATIO — HIGH (ref 0.88–1.16)
LYMPHOCYTES # BLD AUTO: 1.74 K/UL — SIGNIFICANT CHANGE UP (ref 1–3.3)
LYMPHOCYTES # BLD AUTO: 13.4 % — SIGNIFICANT CHANGE UP (ref 13–44)
MAGNESIUM SERPL-MCNC: 2.2 MG/DL — SIGNIFICANT CHANGE UP (ref 1.6–2.6)
MCHC RBC-ENTMCNC: 29.8 PG — SIGNIFICANT CHANGE UP (ref 27–34)
MCHC RBC-ENTMCNC: 32.5 GM/DL — SIGNIFICANT CHANGE UP (ref 32–36)
MCV RBC AUTO: 91.6 FL — SIGNIFICANT CHANGE UP (ref 80–100)
MONOCYTES # BLD AUTO: 0.98 K/UL — HIGH (ref 0–0.9)
MONOCYTES NFR BLD AUTO: 7.6 % — SIGNIFICANT CHANGE UP (ref 2–14)
NEUTROPHILS # BLD AUTO: 10.11 K/UL — HIGH (ref 1.8–7.4)
NEUTROPHILS NFR BLD AUTO: 77.9 % — HIGH (ref 43–77)
NRBC # BLD: 0 /100 WBCS — SIGNIFICANT CHANGE UP (ref 0–0)
PHOSPHATE SERPL-MCNC: 2.6 MG/DL — SIGNIFICANT CHANGE UP (ref 2.5–4.5)
PLATELET # BLD AUTO: 237 K/UL — SIGNIFICANT CHANGE UP (ref 150–400)
POTASSIUM SERPL-MCNC: 3 MMOL/L — LOW (ref 3.5–5.3)
POTASSIUM SERPL-SCNC: 3 MMOL/L — LOW (ref 3.5–5.3)
PROT SERPL-MCNC: 7.6 G/DL — SIGNIFICANT CHANGE UP (ref 6–8.3)
PROTHROM AB SERPL-ACNC: 14.5 SEC — HIGH (ref 10.5–13.4)
RBC # BLD: 4.63 M/UL — SIGNIFICANT CHANGE UP (ref 3.8–5.2)
RBC # FLD: 12.9 % — SIGNIFICANT CHANGE UP (ref 10.3–14.5)
SARS-COV-2 RNA SPEC QL NAA+PROBE: SIGNIFICANT CHANGE UP
SODIUM SERPL-SCNC: 142 MMOL/L — SIGNIFICANT CHANGE UP (ref 135–145)
WBC # BLD: 12.97 K/UL — HIGH (ref 3.8–10.5)
WBC # FLD AUTO: 12.97 K/UL — HIGH (ref 3.8–10.5)

## 2022-12-26 PROCEDURE — 99284 EMERGENCY DEPT VISIT MOD MDM: CPT

## 2022-12-26 RX ORDER — PIPERACILLIN AND TAZOBACTAM 4; .5 G/20ML; G/20ML
3.38 INJECTION, POWDER, LYOPHILIZED, FOR SOLUTION INTRAVENOUS ONCE
Refills: 0 | Status: COMPLETED | OUTPATIENT
Start: 2022-12-26 | End: 2022-12-26

## 2022-12-26 RX ADMIN — PIPERACILLIN AND TAZOBACTAM 200 GRAM(S): 4; .5 INJECTION, POWDER, LYOPHILIZED, FOR SOLUTION INTRAVENOUS at 22:58

## 2022-12-26 NOTE — ED ADULT NURSE NOTE - OBJECTIVE STATEMENT
Pt with h/o perirectal abscess since the 12th of december. Reports worsening with pus. In no acute distress.

## 2022-12-26 NOTE — ED PROVIDER NOTE - CLINICAL SUMMARY MEDICAL DECISION MAKING FREE TEXT BOX
47 yr old female with hx of I&D perirectal abscess, fistulotomy presents to ed c/o rectal abscess since 12/21/22 worsening. while in ed with leaking of pus. + low grade temp, no hiv, no abd pain. was given clinda tid and then flagyl.    perirectal/perianal abscess concerning for fistula formation that might require surgical intervention as pt been on 2 various abx without improvement- labs, abx, ct

## 2022-12-26 NOTE — ED PROVIDER NOTE - OBJECTIVE STATEMENT
47 yr old female with hx of I&D perirectal abscess, fistulotomy presents to ed c/o rectal abscess since 12/21/22 worsening. while in ed with leaking of pus. + low grade temp, no hiv, no abd pain. was given clinda tid and then flagyl.

## 2022-12-26 NOTE — ED PROVIDER NOTE - GASTROINTESTINAL, MLM
Abdomen soft, non-tender, no guarding. perirectal abscess with pus draining. fluctuance deep into rectum

## 2022-12-26 NOTE — ED PROVIDER NOTE - WET READ LAUNCH FT
Clean with soap and water daily. Apply thin layer of bacitracin or triple antibiotic ointment. Cover with gauze, and change dressings daily. Follow up in 7 days for staple removal.  Return to ED sooner for signs of infection such as redness or swelling, high fevers, drainage, numbness or loss of function. Return to ED if you develop any new or worsening symptoms such as severe or worsening headaches; somnolence or confusion; restlessness, unsteadiness, or seizures; difficulties with vision; vomiting, fever, or stiff neck; urinary or bowel incontinence; weakness or numbness involving any part of the body. Patient Education        Learning About a Closed Head Injury  What is a closed head injury? A closed head injury happens when your head gets hit hard. The strong force of the blow causes your brain to shake in your skull. This movement can cause the brain to bruise, swell, or tear. Sometimes nerves or blood vessels also get damaged. This can cause bleeding in or around the brain. A concussion is a type of closed head injury. What are the symptoms? If you have a mild concussion, you may have a mild headache or feel \"not quite right. \" These symptoms are common. They usually go away over a few days to 4 weeks. But sometimes after a concussion, you feel like you can't function as well as before the injury. And you have new symptoms. This is called postconcussive syndrome. You may:  · Find it harder to solve problems, think, concentrate, or remember. · Have headaches. · Have changes in your sleep patterns, such as not being able to sleep or sleeping all the time. · Have changes in your personality. · Not be interested in your usual activities. · Feel angry or anxious without a clear reason. · Lose your sense of taste or smell. · Be dizzy, lightheaded, or unsteady. It may be hard to stand or walk. How is a closed head injury treated? Any person who may have a concussion needs to see a doctor. Some people have to stay in the hospital to be watched. Others can go home safely. If you go home, follow your doctor's instructions. He or she will tell you if you need someone to watch you closely for the next 24 hours or longer. Rest is the best treatment. Get plenty of sleep at night. And try to rest during the day. · Avoid activities that are physically or mentally demanding. These include housework, exercise, and schoolwork. And don't play video games, send text messages, or use the computer. You may need to change your school or work schedule to be able to avoid these activities. · Ask your doctor when it's okay to drive, ride a bike, or operate machinery. · Take an over-the-counter pain medicine, such as acetaminophen (Tylenol), ibuprofen (Advil, Motrin), or naproxen (Aleve). Be safe with medicines. Read and follow all instructions on the label. · Check with your doctor before you use any other medicines for pain. · Do not drink alcohol or use illegal drugs. They can slow recovery. They can also increase your risk of getting a second head injury. Follow-up care is a key part of your treatment and safety. Be sure to make and go to all appointments, and call your doctor if you are having problems. It's also a good idea to know your test results and keep a list of the medicines you take. Where can you learn more? Go to http://blanche-carri.info/. Enter E235 in the search box to learn more about \"Learning About a Closed Head Injury. \"  Current as of: March 28, 2019  Content Version: 12.1  © 0184-9552 Healthwise, Incorporated. Care instructions adapted under license by [employer] (which disclaims liability or warranty for this information). If you have questions about a medical condition or this instruction, always ask your healthcare professional. [address] any warranty or liability for your use of this information.          Patient Education Cuts Closed With Staples: Care Instructions  Your Care Instructions  A cut can happen anywhere on your body. The doctor used staples to close the cut. Staples easily and quickly close a cut, which helps the cut heal.  Sometimes a cut can injure tendons, blood vessels, or nerves. If the cut went deep and through the skin, the doctor may have put in a layer of stitches below the staples. The deeper layer of stitches brings the deep part of the cut together. These stitches will dissolve and don't need to be removed. The staples in the upper layer are what you see on the cut. You may have a bandage. You will need to have the staples removed, usually in 7 to 14 days. The doctor has checked you carefully, but problems can develop later. If you notice any problems or new symptoms, get medical treatment right away. Follow-up care is a key part of your treatment and safety. Be sure to make and go to all appointments, and call your doctor if you are having problems. It's also a good idea to know your test results and keep a list of the medicines you take. How can you care for yourself at home? · Keep the cut dry for the first 24 to 48 hours. After this, you can shower if your doctor okays it. Pat the cut dry. · Don't soak the cut, such as in a bathtub. Your doctor will tell you when it's safe to get the cut wet. · If your doctor told you how to care for your cut, follow your doctor's instructions. If you did not get instructions, follow this general advice:  ? After the first 24 to 48 hours, wash around the cut with clean water 2 times a day. Don't use hydrogen peroxide or alcohol, which can slow healing. ? You may cover the cut with a thin layer of petroleum jelly, such as Vaseline, and a nonstick bandage. ? Apply more petroleum jelly and replace the bandage as needed. · Avoid any activity that could cause your cut to reopen. · Do not remove the staples on your own.  Your doctor will tell you when to come back to have the staples removed. · Take pain medicines exactly as directed. ? If the doctor gave you a prescription medicine for pain, take it as prescribed. ? If you are not taking a prescription pain medicine, ask your doctor if you can take an over-the-counter medicine. When should you call for help? Call your doctor now or seek immediate medical care if:    · You have new pain, or your pain gets worse.     · The skin near the cut is cold or pale or changes color.     · You have tingling, weakness, or numbness near the cut.     · The cut starts to bleed, and blood soaks through the bandage. Oozing small amounts of blood is normal.     · You have trouble moving the area near the cut.     · You have symptoms of infection, such as:  ? Increased pain, swelling, warmth, or redness around the cut.  ? Red streaks leading from the cut.  ? Pus draining from the cut.  ? A fever.    Watch closely for changes in your health, and be sure to contact your doctor if:    · You do not get better as expected. Where can you learn more? Go to http://blanche-carri.info/. Enter U398 in the search box to learn more about \"Cuts Closed With Staples: Care Instructions. \"  Current as of: September 23, 2018  Content Version: 12.1  © 0412-7339 Healthwise, Incorporated. Care instructions adapted under license by [employer] (which disclaims liability or warranty for this information). If you have questions about a medical condition or this instruction, always ask your healthcare professional. [place] [address] any warranty or liability for your use of this information. There are no Wet Read(s) to document.

## 2022-12-26 NOTE — ED ADULT TRIAGE NOTE - WEIGHT IN LBS
MEDICARE WELLNESS VISIT + NOTE    CHIEF COMPLAINT:  Elizabeth Warner presents for her First Annual Medicare Wellness Visit.   Her additional complaints or concerns are addressed below.      Patient Care Team:  Maurisio Jung MD as PCP - General  Kellen Ulloa DO as Cardiologist (Cardiovascular Disease)        Patient Active Problem List   Diagnosis   • Gastroesophageal reflux disease without esophagitis   • Anemia   • Asthma   • HTN (hypertension), benign   • Dermatitis   • Flu vaccine need   • Hyperlipidemia   • Low back pain   • Muscle cramps   • Visit for screening mammogram   • Vitamin D deficiency   • Bilateral foot pain   • Gout   • Abnormal mammogram   • Skin lesion of breast   • Administrative encounter   • Screening for osteoporosis   • Pain in both knees   • Adenomatous polyp of descending colon   • Allergic conjunctivitis of both eyes   • History of 2019 novel coronavirus disease (COVID-19)   • Diastolic dysfunction   • Trigger ring finger of right hand   • Trigger little finger of right hand   • Occult blood positive stool   • Lung granuloma (CMS/HCC)   • Chronic left hip pain   • Chronic right shoulder pain   • Dizziness   • Post-nasal drip   • Raspy voice   • Arthritis of both knees   • Recurrent major depressive disorder, in full remission (CMS/HCC)   • Ataxic gait   • Nonruptured cerebral aneurysm   • Pre-operative examination   • Encounter for Medicare annual wellness exam   • Encounter for health-related screening   • Screening examination for pulmonary tuberculosis   • Type 2 diabetes mellitus with stage 3a chronic kidney disease, without long-term current use of insulin (CMS/HCC)         Past Medical History:   Diagnosis Date   • Arthritis    • Eczema    • Essential (primary) hypertension    • Gout    • Neuropathy of both feet    • RAD (reactive airway disease)    • Seasonal allergies          Past Surgical History:   Procedure Laterality Date   •  delivery only     • Foot surgery       bilateral bunion surgery   • Hysterectomy           Social History     Tobacco Use   • Smoking status: Never Smoker   • Smokeless tobacco: Never Used   Vaping Use   • Vaping Use: never used   Substance Use Topics   • Alcohol use: No   • Drug use: No     Family Status   Relation Name Status   • Mo     • Fa     • Bro     • OTHER  (Not Specified)         Current Outpatient Medications   Medication Sig Dispense Refill   • chlorthalidone (THALITONE) 25 MG tablet TAKE 1 TABLET EVERY DAY 90 tablet 3   • hydroCORTisone (CORTIZONE) 2.5 % cream APPLY TO THE AFFECTED AREA(S) TWICE DAILY 30 g 1   • carvedilol (COREG) 25 MG tablet TAKE 1 TABLET TWICE DAILY WITH MEALS 180 tablet 1   • famotidine (PEPCID) 20 MG tablet TAKE 1 TABLET TWICE DAILY 180 tablet 1   • sertraline (ZOLOFT) 100 MG tablet TAKE 1 TABLET EVERY DAY 90 tablet 1   • montelukast (SINGULAIR) 10 MG tablet TAKE 1 TABLET EVERY EVENING 90 tablet 1   • Mitigare 0.6 MG capsule Take    1  Capsule    By   Mouth   twice  A   Day   As   Needed   For  gout   Flare-up 60 capsule 1   • amLODIPine (NORVASC) 10 MG tablet TAKE 1 TABLET EVERY DAY 90 tablet 3   • atorvastatin (LIPITOR) 20 MG tablet TAKE 1 TABLET EVERY DAY 90 tablet 3   • cloNIDine (CATAPRES) 0.2 MG tablet TAKE 1 TABLET THREE TIMES DAILY 270 tablet 1   • ibuprofen (MOTRIN) 800 MG tablet Take 1 tablet by mouth every 8 hours as needed for Pain. 270 tablet 0   • gabapentin (NEURONTIN) 300 MG capsule Take 1 capsule by mouth 3 times daily. 270 capsule 1   • cetirizine (ZyrTEC) 5 MG tablet Take 1 tablet by mouth at bedtime. 30 tablet 0   • olopatadine (PATANOL) 0.1 % ophthalmic solution Place 1 drop into both eyes 2 times daily. 5 mL 0   • albuterol (VENTOLIN) (2.5 MG/3ML) 0.083% nebulizer solution Take 3 mLs by nebulization every 6 hours as needed for Wheezing or Shortness of Breath. 375 mL 0   • meclizine (ANTIVERT) 12.5 MG tablet Take 1 tablet by mouth 3 times daily as needed for Dizziness. 30  tablet 1   • baclofen (LIORESAL) 20 MG tablet Take 1 tablet by mouth 2 times daily as needed (muscle   spasm). 60 tablet 1   • methocarbamol (ROBAXIN) 750 MG tablet Take 750 mg by mouth 4 times daily as needed.      • ondansetron (ZOFRAN ODT) 4 MG disintegrating tablet Place 4 mg onto the tongue every 8 hours as needed for Nausea.     • triamcinolone (ARISTOCORT) 0.1 % cream Apply topically 2 times daily. 80 g 1   • loratadine (CLARITIN) 10 MG tablet Take 1 tablet by mouth daily as needed for Allergies. 30 tablet 5   • budesonide-formoterol (SYMBICORT) 160-4.5 MCG/ACT inhaler Inhale 2 puffs into the lungs 2 times daily. 10.2 g 5   • albuterol 108 (90 Base) MCG/ACT inhaler Inhale 2 puffs into the lungs every 4 hours as needed for Shortness of Breath or Wheezing. 1 Inhaler 5   • ferrous sulfate 325 (65 FE) MG tablet TAKE 1 TABLET DAILY       No current facility-administered medications for this visit.        The following items on the Medicare Health Risk Assessment were found to be positive  3.) During the past 4 weeks, how would you rate your health?: Fair     5.) Do you do moderate to strenuous exercise (brisk walk) for about 20 minutes for 3 or more days per week?: No, I usually do not exercise this much     7b.) Do you feel unsteady when standing or walking?: Yes     7c.) Do you worry about falling?: Yes         Vision and Hearing screens: Hearing screening was performed and reviewed.  Vision screening was not able to be completed today due to patient's pre-existing vision condition    Advance Directive:   The patient has the following documents:  Power of  for Health Care  Pt  Wants      To  Be    Resuscitated      Cognitive/Functional Status: no evidence of cognitive dysfunction by direct observation    Opioid Review: Elizabeth is not taking opioid medications.    Recent PHQ 2/9 Score:    PHQ 2:  Date Adult PHQ 2 Score Adult PHQ 2 Interpretation   6/2/2022 1 No further screening needed       PHQ 9:        DEPRESSION ASSESSMENT/PLAN:  Depression screening is negative no further plan needed.     Body mass index is 32.17 kg/m².    BMI ASSESSMENT/PLAN:  Patient is obese.    Caloric restriction and Low carbohydrate diet        See Patient Instructions section.   Return in about 3 weeks (around 6/23/2022), or if symptoms worsen or fail to improve.      OUTPATIENT PROGRESS NOTE    Subjective   Chief Complaint Medicare Wellness Visit    79 Yo    Bf   With  H/o  htn   /asthma   /chronic   lbp   /depression   /cerebral   Aneurysm   Pt  Is   Here for   Medicare   Wellness   Visit    Needs    F/u   Labs     Pt  Is   Doing   Ok    Has  Good  Appetite     No chest  Pain   Or sob   Has  Chronic    lbp   And    depression   Lost   Her   Long   Time  Male friend  A Few    Yrs  Ago   Still   Having   Hard  Time    Coping  With the   Lost    H/o    cerebral   Aneurysm  schedule   For   Cerebral  Angiogram   6/8/22  At  [facility]  Needs   Annual   Employment     Completed    Also   Needs    TB  Testing        Medications  Medications were reviewed and updated today.    Histories  I have personally reviewed and updated the patient's past medical, past surgical, family and social histories during today's visit.    Review of Systems   Constitutional: Negative for appetite change, chills and fever.   HENT: Negative for sore throat and trouble swallowing.    Eyes: Negative for visual disturbance.   Respiratory: Negative for cough and shortness of breath.    Cardiovascular: Negative for chest pain and palpitations.   Gastrointestinal: Negative for abdominal pain, blood in stool, nausea and vomiting.   Endocrine: Negative for cold intolerance.   Genitourinary: Negative for dysuria and hematuria.   Musculoskeletal: Positive for back pain.   Skin: Negative for rash.   Allergic/Immunologic: Positive for environmental allergies.   Neurological: Negative for dizziness, syncope and headaches.   Hematological: Does not bruise/bleed  easily.   Psychiatric/Behavioral:        Depress  Mood        Objective   Visit Vitals  /66 (BP Location: RUE - Right upper extremity, Patient Position: Sitting, Cuff Size: Regular)   Pulse (!) 53   Temp 95.8 °F (35.4 °C) (Tympanic)   Resp 16   Ht 5' 7\" (1.702 m)   Wt 93.2 kg (205 lb 6.4 oz)   SpO2 96%   BMI 32.17 kg/m²     Physical Exam  Vitals and nursing note reviewed.   Constitutional:       General: She is not in acute distress.     Appearance: Normal appearance. She is well-developed. She is obese. She is not ill-appearing, toxic-appearing or diaphoretic.   HENT:      Head: Normocephalic and atraumatic.   Eyes:      General: No scleral icterus.        Right eye: No discharge.         Left eye: No discharge.      Conjunctiva/sclera: Conjunctivae normal.   Neck:      Thyroid: No thyromegaly.      Trachea: No tracheal deviation.   Cardiovascular:      Rate and Rhythm: Normal rate and regular rhythm.      Heart sounds: Normal heart sounds. No murmur heard.  Pulmonary:      Effort: Pulmonary effort is normal. No respiratory distress.      Breath sounds: Normal breath sounds. No wheezing, rhonchi or rales.   Abdominal:      General: There is no distension.      Palpations: Abdomen is soft.      Tenderness: There is no abdominal tenderness.   Musculoskeletal:      Cervical back: Normal range of motion and neck supple. No rigidity or tenderness.      Right lower leg: No edema.      Left lower leg: No edema.   Lymphadenopathy:      Cervical: No cervical adenopathy.   Skin:     General: Skin is warm and dry.   Neurological:      General: No focal deficit present.      Mental Status: She is alert. Mental status is at baseline.   Psychiatric:         Mood and Affect: Mood normal.         Behavior: Behavior normal.         Thought Content: Thought content normal.         Judgment: Judgment normal.         Laboratory  I have reviewed the pertinent laboratory tests. These are the pertinent findings:labs   Ordered      Imaging  I have reviewed the pertinent imaging study reports. These are the pertinent findings: none      Assessment & Plan   Diagnoses and associated orders for this visit:  1. Encounter for Medicare annual wellness exam  Assessment & Plan:  POA  Done   Pt  Wants   To    Resuscitated    2. Screening examination for pulmonary tuberculosis  Assessment & Plan:  Check   Quantiferon   TB   Plus   Orders:  -     Quantiferon TB Plus  3. Encounter for health-related screening  -     Rubella Antibody IgG  -     ABO GROUP/RH /SCREEN OUTPATIENT  4. HTN (hypertension), benign  Assessment & Plan:    bp  Controlled    5. Asthma, unspecified asthma severity, unspecified whether complicated, unspecified whether persistent  Assessment & Plan:    Asthma   Stable  CPM    6. Lung granuloma (CMS/[facility])  Assessment & Plan:  Monitor: The problem is stable.  Evaluation: No labs/tests required today.  Assessment/Treatment:  Continue current treatment/monitoring regimen.  7. Type 2 diabetes mellitus with stage 3a chronic kidney disease, without long-term current use of insulin (CMS/[facility])  Assessment & Plan:  Monitor: The problem is stable.  Evaluation: Labs/tests ordered, see encounter summary.  Assessment/Treatment:  Continue current treatment/monitoring regimen.  Orders:  -     Glycohemoglobin  8. Recurrent major depressive disorder, in full remission (CMS/[facility])  Assessment & Plan:  Monitor: The problem is stable    Evaluation: labs  Ordered   Assessment/Treatment:  Cont   Present  Zoloft  Therapy    9. Chronic low back pain without sciatica, unspecified back pain laterality  Assessment & Plan:  Will    Monitor  Take  Tylenol  As  Needed    For  Pain       121.2

## 2022-12-26 NOTE — ED ADULT NURSE NOTE - NSIMPLEMENTINTERV_GEN_ALL_ED
Implemented All Universal Safety Interventions:  [place] to call system. Call bell, personal items and telephone within reach. Instruct patient to call for assistance. Room bathroom lighting operational. Non-slip footwear when patient is off stretcher. Physically safe environment: no spills, clutter or unnecessary equipment. Stretcher in lowest position, wheels locked, appropriate side rails in place.

## 2022-12-27 DIAGNOSIS — K61.1 RECTAL ABSCESS: ICD-10-CM

## 2022-12-27 LAB
ANION GAP SERPL CALC-SCNC: 6 MMOL/L — SIGNIFICANT CHANGE UP (ref 5–17)
BUN SERPL-MCNC: 12 MG/DL — SIGNIFICANT CHANGE UP (ref 7–18)
CALCIUM SERPL-MCNC: 8.3 MG/DL — LOW (ref 8.4–10.5)
CHLORIDE SERPL-SCNC: 114 MMOL/L — HIGH (ref 96–108)
CO2 SERPL-SCNC: 21 MMOL/L — LOW (ref 22–31)
CREAT SERPL-MCNC: 0.84 MG/DL — SIGNIFICANT CHANGE UP (ref 0.5–1.3)
EGFR: 87 ML/MIN/1.73M2 — SIGNIFICANT CHANGE UP
GLUCOSE SERPL-MCNC: 107 MG/DL — HIGH (ref 70–99)
HCT VFR BLD CALC: 39.1 % — SIGNIFICANT CHANGE UP (ref 34.5–45)
HGB BLD-MCNC: 12.8 G/DL — SIGNIFICANT CHANGE UP (ref 11.5–15.5)
MCHC RBC-ENTMCNC: 29.7 PG — SIGNIFICANT CHANGE UP (ref 27–34)
MCHC RBC-ENTMCNC: 32.7 GM/DL — SIGNIFICANT CHANGE UP (ref 32–36)
MCV RBC AUTO: 90.7 FL — SIGNIFICANT CHANGE UP (ref 80–100)
NRBC # BLD: 0 /100 WBCS — SIGNIFICANT CHANGE UP (ref 0–0)
PLATELET # BLD AUTO: 225 K/UL — SIGNIFICANT CHANGE UP (ref 150–400)
POTASSIUM SERPL-MCNC: 3.9 MMOL/L — SIGNIFICANT CHANGE UP (ref 3.5–5.3)
POTASSIUM SERPL-SCNC: 3.9 MMOL/L — SIGNIFICANT CHANGE UP (ref 3.5–5.3)
RBC # BLD: 4.31 M/UL — SIGNIFICANT CHANGE UP (ref 3.8–5.2)
RBC # FLD: 13.1 % — SIGNIFICANT CHANGE UP (ref 10.3–14.5)
SODIUM SERPL-SCNC: 141 MMOL/L — SIGNIFICANT CHANGE UP (ref 135–145)
WBC # BLD: 7.71 K/UL — SIGNIFICANT CHANGE UP (ref 3.8–10.5)
WBC # FLD AUTO: 7.71 K/UL — SIGNIFICANT CHANGE UP (ref 3.8–10.5)

## 2022-12-27 PROCEDURE — 74177 CT ABD & PELVIS W/CONTRAST: CPT | Mod: 26,MA

## 2022-12-27 RX ORDER — ONDANSETRON 8 MG/1
4 TABLET, FILM COATED ORAL EVERY 6 HOURS
Refills: 0 | Status: DISCONTINUED | OUTPATIENT
Start: 2022-12-27 | End: 2022-12-29

## 2022-12-27 RX ORDER — ERTAPENEM SODIUM 1 G/1
1000 INJECTION, POWDER, LYOPHILIZED, FOR SOLUTION INTRAMUSCULAR; INTRAVENOUS EVERY 24 HOURS
Refills: 0 | Status: DISCONTINUED | OUTPATIENT
Start: 2022-12-27 | End: 2022-12-29

## 2022-12-27 RX ORDER — ACETAMINOPHEN 500 MG
650 TABLET ORAL EVERY 6 HOURS
Refills: 0 | Status: DISCONTINUED | OUTPATIENT
Start: 2022-12-27 | End: 2022-12-29

## 2022-12-27 RX ORDER — DEXTROSE MONOHYDRATE, SODIUM CHLORIDE, AND POTASSIUM CHLORIDE 50; .745; 4.5 G/1000ML; G/1000ML; G/1000ML
1000 INJECTION, SOLUTION INTRAVENOUS
Refills: 0 | Status: DISCONTINUED | OUTPATIENT
Start: 2022-12-27 | End: 2022-12-29

## 2022-12-27 RX ORDER — POTASSIUM CHLORIDE 20 MEQ
40 PACKET (EA) ORAL EVERY 4 HOURS
Refills: 0 | Status: COMPLETED | OUTPATIENT
Start: 2022-12-27 | End: 2022-12-27

## 2022-12-27 RX ORDER — HYDROMORPHONE HYDROCHLORIDE 2 MG/ML
0.5 INJECTION INTRAMUSCULAR; INTRAVENOUS; SUBCUTANEOUS
Refills: 0 | Status: DISCONTINUED | OUTPATIENT
Start: 2022-12-27 | End: 2022-12-29

## 2022-12-27 RX ADMIN — DEXTROSE MONOHYDRATE, SODIUM CHLORIDE, AND POTASSIUM CHLORIDE 100 MILLILITER(S): 50; .745; 4.5 INJECTION, SOLUTION INTRAVENOUS at 06:17

## 2022-12-27 RX ADMIN — Medication 40 MILLIEQUIVALENT(S): at 09:53

## 2022-12-27 RX ADMIN — Medication 650 MILLIGRAM(S): at 18:07

## 2022-12-27 RX ADMIN — Medication 650 MILLIGRAM(S): at 16:41

## 2022-12-27 RX ADMIN — Medication 40 MILLIEQUIVALENT(S): at 06:17

## 2022-12-27 RX ADMIN — DEXTROSE MONOHYDRATE, SODIUM CHLORIDE, AND POTASSIUM CHLORIDE 100 MILLILITER(S): 50; .745; 4.5 INJECTION, SOLUTION INTRAVENOUS at 05:38

## 2022-12-27 RX ADMIN — DEXTROSE MONOHYDRATE, SODIUM CHLORIDE, AND POTASSIUM CHLORIDE 100 MILLILITER(S): 50; .745; 4.5 INJECTION, SOLUTION INTRAVENOUS at 18:38

## 2022-12-27 RX ADMIN — ERTAPENEM SODIUM 120 MILLIGRAM(S): 1 INJECTION, POWDER, LYOPHILIZED, FOR SOLUTION INTRAMUSCULAR; INTRAVENOUS at 04:31

## 2022-12-27 NOTE — H&P ADULT - ASSESSMENT
46 y.o. F with recurrent perirectal abscess    -Admit to surgery under Dr. Hayden  -IV abx  -Keep NPO except meds for now  -IVF while NPO  -Pain control prn  -DVT ppx  -Given multiple recurrence, ID consult in AM for possible long term IV abx course   47 y.o. F with recurrent perirectal abscess    -Admit to surgery under Dr. Hayden  -IV abx  -Keep NPO except meds for now  -IVF while NPO  -Pain control prn  -DVT ppx  -Given multiple recurrence, ID consult in AM for possible long term IV abx course    Hypokalemia  -PO repletion

## 2022-12-27 NOTE — H&P ADULT - RECTAL EXAM
Normal sphincter tone. Large external hemorrhoids. L perirectal area with erythema, with central outpouching punctum without active drainage. Minimally tender

## 2022-12-27 NOTE — H&P ADULT - HISTORY OF PRESENT ILLNESS
46 y.o. F with [facility] perirectal abscess s/p I&D Sept '21 presents to [facility] ER c/o perirectal pain with fever. Pt states she had small bump around her rectum on 12/21/22 which become progressively larger and more painful. Pt went to urgent care center and received clindamycin and flagyl, which pt has been taking as instructed. Yesterday pt developed fever of 101F. Pain became unbearable and pt came to [facility] ER. While on stretcher, pt states abscess spontaneously drained, before CT abd/pelvis was performed, giving the pt relief of pain. Pt provided picture of sanitary pad, saturated with seropurulent fluid. This episode similar to 1st episode in September 2021, when pt had large painful L perirectal abscess that PO abx by urgent care did not alleviate. Pt underwent I&D perirectal abscess with fistulotomy with Dr. Duong. Culture taken in OR showed Klebsiella, B Fragilis, and Proteus, which had resistance to some PO abx. Pt followed up 1 month after with no further issues. However, pt notes that in May 2022 she had another episode of similar symptoms, but was treated in urgent care. 47 y.o. F with [facility] perirectal abscess s/p I&D Sept '21 presents to [facility] ER c/o perirectal pain with fever. Pt states she had small bump around her rectum on 12/21/22 which become progressively larger and more painful. Pt went to urgent care center and received clindamycin and flagyl, which pt has been taking as instructed. Yesterday pt developed fever of 101F. Pain became unbearable and pt came to [facility] ER. While on stretcher, pt states abscess spontaneously drained, before CT abd/pelvis was performed, giving the pt relief of pain. Pt provided picture of sanitary pad, saturated with seropurulent fluid. This episode similar to 1st episode in September 2021, when pt had large painful L perirectal abscess that PO abx by urgent care did not alleviate. Pt underwent I&D perirectal abscess with fistulotomy with Dr. Duong. Culture taken in OR showed Klebsiella, B Fragilis, and Proteus, which had resistance to some PO abx. Pt followed up 1 month after with no further issues. However, pt notes that in May 2022 she had another episode of similar symptoms, but was treated in urgent care.

## 2022-12-27 NOTE — CONSULT NOTE ADULT - ASSESSMENT
Perianal abscess ( recurrent)      Plan - Cont Invanz 1 gm iv q24 hrs  get an extended dwell catheter  will treat with Invanz for a total of 14 days including abxs received in the hospital.

## 2022-12-27 NOTE — CONSULT NOTE ADULT - SUBJECTIVE AND OBJECTIVE BOX
Patient is a 47y old  Female who presents with a chief complaint of Recurrent perirectal abscess (27 Dec 2022 08:03)      History of Present Illness:  Reason for Admission: Recurrent perirectal abscess  History of Present Illness:   47 y.o. F with [facility] perirectal abscess s/p I&D Sept '21 presents to [facility] ER c/o perirectal pain with fever. Pt states she had small bump around her rectum on 12/21/22 which become progressively larger and more painful. Pt went to urgent care center and received clindamycin and flagyl, which pt has been taking as instructed. Yesterday pt developed fever of 101F. Pain became unbearable and pt came to [facility] ER. While on stretcher, pt states abscess spontaneously drained, before CT abd/pelvis was performed, giving the pt relief of pain. Pt provided picture of sanitary pad, saturated with seropurulent fluid. This episode similar to 1st episode in September 2021, when pt had large painful L perirectal abscess that PO abx by urgent care did not alleviate. Pt underwent I&D perirectal abscess with fistulotomy with Dr. Duong. Culture taken in OR showed Klebsiella, B Fragilis, and Proteus, which had resistance to some PO abx. Pt followed up 1 month after with no further issues. However, pt notes that in May 2022 she had another episode of similar symptoms, but was treated in urgent care.  As Above. Awake, alert, comfortable in bed in NAD  INTERVAL HPI/OVERNIGHT EVENTS:  T(C): 36.8 (12-27-22 @ 06:45), Max: 37.3 (12-26-22 @ 19:17)  HR: 76 (12-27-22 @ 06:45) (72 - 98)  BP: 125/80 (12-27-22 @ 06:45) (108/70 - 130/79)  RR: 18 (12-27-22 @ 06:45) (18 - 18)  SpO2: 99% (12-27-22 @ 06:45) (99% - 100%)  Wt(kg): --  I&O's Summary    26 Dec 2022 07:01  -  27 Dec 2022 07:00  --------------------------------------------------------  IN: 200 mL / OUT: 0 mL / NET: 200 mL        PAST MEDICAL & SURGICAL HISTORY:  Internal hemorrhoids      Migraine      H/O: hysterectomy          SOCIAL HISTORY  Alcohol:  Tobacco:  Illicit substance use:      FAMILY HISTORY:      LABS:                        12.8   7.71  )-----------( 225      ( 27 Dec 2022 10:40 )             39.1     12-27    141  |  114<H>  |  12  ----------------------------<  107<H>  3.9   |  21<L>  |  0.84    Ca    8.3<L>      27 Dec 2022 10:40  Phos  2.6     12-26  Mg     2.2     12-26    TPro  7.6  /  Alb  3.4<L>  /  TBili  0.5  /  DBili  x   /  AST  14  /  ALT  36  /  AlkPhos  89  12-26    PT/INR - ( 26 Dec 2022 21:30 )   PT: 14.5 sec;   INR: 1.22 ratio         PTT - ( 26 Dec 2022 21:30 )  PTT:37.0 sec    CAPILLARY BLOOD GLUCOSE                MEDICATIONS  (STANDING):  ertapenem  IVPB 1000 milliGRAM(s) IV Intermittent every 24 hours  sodium chloride 0.45% with potassium chloride 20 mEq/L 1000 milliLiter(s) (100 mL/Hr) IV Continuous <Continuous>    MEDICATIONS  (PRN):  acetaminophen     Tablet .. 650 milliGRAM(s) Oral every 6 hours PRN Temp greater or equal to 38C (100.4F), Mild Pain (1 - 3)  HYDROmorphone  Injectable 0.5 milliGRAM(s) IV Push every 3 hours PRN Severe Pain (7 - 10)  ondansetron Injectable 4 milliGRAM(s) IV Push every 6 hours PRN Nausea  oxycodone    5 mG/acetaminophen 325 mG 1 Tablet(s) Oral every 6 hours PRN Moderate Pain (4 - 6)      REVIEW OF SYSTEMS:  CONSTITUTIONAL: No fever, weight loss, or fatigue  EYES: No eye pain, visual disturbances, or discharge  ENMT:  No difficulty hearing, tinnitus, vertigo; No sinus or throat pain  NECK: No pain or stiffness  RESPIRATORY: No cough, wheezing, chills or hemoptysis; No shortness of breath  CARDIOVASCULAR: No chest pain, palpitations, dizziness, or leg swelling  GASTROINTESTINAL: No abdominal or epigastric pain. No nausea, vomiting, or hematemesis; No diarrhea or constipation. No melena or hematochezia. Perirectal pain  GENITOURINARY: No dysuria, frequency, hematuria, or incontinence  NEUROLOGICAL: No headaches, memory loss, loss of strength, numbness, or tremors  SKIN: No itching, burning, rashes, or lesions   LYMPH NODES: No enlarged glands  ENDOCRINE: No heat or cold intolerance; No hair loss  MUSCULOSKELETAL: No joint pain or swelling; No muscle, back, or extremity pain  PSYCHIATRIC: No depression, anxiety, mood swings, or difficulty sleeping  HEME/LYMPH: No easy bruising, or bleeding gums  ALLERY AND IMMUNOLOGIC: No hives or eczema    PHYSICAL EXAM:  GENERAL: NAD, well-groomed, well-developed  HEAD:  Atraumatic, Normocephalic  EYES: EOMI, PERRLA, conjunctiva and sclera clear  ENMT: No tonsillar erythema, exudates, or enlargement; Moist mucous membranes, Good dentition, No lesions  NECK: Supple, No JVD, Normal thyroid  NERVOUS SYSTEM:  Alert & Oriented X3, Good concentration; Motor Strength 5/5 B/L upper and lower extremities; DTRs 2+ intact and symmetric  CHEST/LUNG: Clear to percussion bilaterally; No rales, rhonchi, wheezing, or rubs  HEART: Regular rate and rhythm; No murmurs, rubs, or gallops  ABDOMEN: Soft, Nontender, Nondistended; Bowel sounds present. Perirectal swelling  EXTREMITIES:  2+ Peripheral Pulses, No clubbing, cyanosis, or edema  LYMPH: No lymphadenopathy noted  SKIN: No rashes or lesions    RADIOLOGY & ADDITIONAL TESTS:  < from: CT Abdomen and Pelvis w/ IV Cont (12.27.22 @ 01:01) >  IMPRESSION:  Ill-defined soft tissue extending from the left posterior perianal region   to the left buttocks where there is a 7 mm ill-defined hypoattenuating   focus with tiny airsuggestive of small abscess formation. Tiny focus of   air in the left perianal region suggestive of fistulous tract. Mild soft   tissue reticulation of the left buttocks with area of skin thickening   adjacent to the intergluteal cleft.    < end of copied text >    Imaging Personally Reviewed:  [x ] YES  [ ] NO    Consultant(s) Notes Reviewed:  [x ] YES  [ ] NO        Care Discussed with Consultants/Other Providers [x ] YES  [ ] NO

## 2022-12-27 NOTE — CONSULT NOTE ADULT - SUBJECTIVE AND OBJECTIVE BOX
HPI:  47 y.o. F with [facility] perirectal abscess s/p I&D Sept '21 presents to [facility] ER c/o perirectal pain with fever. Pt states she had small bump around her rectum on 12/21/22 which become progressively larger and more painful. Pt went to urgent care center and received clindamycin and flagyl, which pt has been taking as instructed. Yesterday pt developed fever of 101F. Pain became unbearable and pt came to [facility] ER. While on stretcher, pt states abscess spontaneously drained, before CT abd/pelvis was performed, giving the pt relief of pain. Pt provided picture of sanitary pad, saturated with seropurulent fluid. This episode similar to 1st episode in September 2021, when pt had large painful L perirectal abscess that PO abx by urgent care did not alleviate. Pt underwent I&D perirectal abscess with fistulotomy with Dr. Duong. Culture taken in OR showed Klebsiella, B Fragilis, and Proteus, which had resistance to some PO abx. Pt followed up 1 month after with no further issues. However, pt notes that in May 2022 she had another episode of similar symptoms, but was treated in urgent care. (27 Dec 2022 03:00)      PAST MEDICAL & SURGICAL HISTORY:  Internal hemorrhoids      Migraine      H/O: hysterectomy          No Known Allergies      Meds:  acetaminophen     Tablet .. 650 milliGRAM(s) Oral every 6 hours PRN  ertapenem  IVPB 1000 milliGRAM(s) IV Intermittent every 24 hours  HYDROmorphone  Injectable 0.5 milliGRAM(s) IV Push every 3 hours PRN  ondansetron Injectable 4 milliGRAM(s) IV Push every 6 hours PRN  oxycodone    5 mG/acetaminophen 325 mG 1 Tablet(s) Oral every 6 hours PRN  sodium chloride 0.45% with potassium chloride 20 mEq/L 1000 milliLiter(s) IV Continuous <Continuous>      SOCIAL HISTORY:  Smoker:  YES / NO        PACK YEARS:                         WHEN QUIT?  ETOH use:  YES / NO               FREQUENCY / QUANTITY:  Ilicit Drug use:  YES / NO  Occupation:  Assisted device use (Cane / Walker):  Live with:    FAMILY HISTORY:      VITALS:  Vital Signs Last 24 Hrs  T(C): 36.3 (27 Dec 2022 12:48), Max: 37.3 (26 Dec 2022 19:17)  T(F): 97.4 (27 Dec 2022 12:48), Max: 99.1 (26 Dec 2022 19:17)  HR: 76 (27 Dec 2022 12:48) (72 - 98)  BP: 110/66 (27 Dec 2022 12:48) (108/70 - 130/79)  BP(mean): --  RR: 16 (27 Dec 2022 12:48) (16 - 18)  SpO2: 99% (27 Dec 2022 12:48) (99% - 100%)    Parameters below as of 27 Dec 2022 12:48  Patient On (Oxygen Delivery Method): room air        LABS/DIAGNOSTIC TESTS:                          12.8   7.71  )-----------( 225      ( 27 Dec 2022 10:40 )             39.1     WBC Count: 7.71 K/uL (12-27 @ 10:40)  WBC Count: 12.97 K/uL (12-26 @ 21:30)      12-27    141  |  114<H>  |  12  ----------------------------<  107<H>  3.9   |  21<L>  |  0.84    Ca    8.3<L>      27 Dec 2022 10:40  Phos  2.6     12-26  Mg     2.2     12-26    TPro  7.6  /  Alb  3.4<L>  /  TBili  0.5  /  DBili  x   /  AST  14  /  ALT  36  /  AlkPhos  89  12-26          LIVER FUNCTIONS - ( 26 Dec 2022 21:30 )  Alb: 3.4 g/dL / Pro: 7.6 g/dL / ALK PHOS: 89 U/L / ALT: 36 U/L DA / AST: 14 U/L / GGT: x             PT/INR - ( 26 Dec 2022 21:30 )   PT: 14.5 sec;   INR: 1.22 ratio         PTT - ( 26 Dec 2022 21:30 )  PTT:37.0 sec    LACTATE:    ABG -     CULTURES:       RADIOLOGY:< from: CT Abdomen and Pelvis w/ IV Cont (12.27.22 @ 01:01) >    ACC: [id number] EXAM:  CT ABDOMEN AND PELVIS IC                          PROCEDURE DATE:  12/27/2022          INTERPRETATION:  CLINICAL INFORMATION: Question rectal abscess.    COMPARISON: CT pelvis 9/15/2021.    CONTRAST/COMPLICATIONS:  IV Contrast:Omnipaque 350  90 cc administered   10 cc discarded  Oral Contrast: NONE  Complications: None reported at time of study completion    PROCEDURE:  CT of the Abdomen and Pelvis was performed.  Sagittal and coronal reformats were performed.    FINDINGS:  LOWER CHEST: Within normal limits.    LIVER: 1.5 cm hypoattenuating lesion, possible hemangioma. Additional   subcentimeter hypoattenuating lesions too small to characterize.  BILE DUCTS: Normal caliber.  GALLBLADDER: Within normal limits.  SPLEEN: Within normal limits.  PANCREAS: Within normal limits.  ADRENALS: Within normal limits.  KIDNEYS/URETERS: Nonobstructing 2 mm left lower pole renal calculus.   Symmetric enhancement. No hydronephrosis.    BLADDER: Within normal limits.  REPRODUCTIVE ORGANS: Hysterectomy.    BOWEL: No bowel obstruction. Appendix is normal.  PERITONEUM: No ascites.  VESSELS: Within normal limits.  RETROPERITONEUM/LYMPH NODES: No lymphadenopathy.  ABDOMINAL WALL: In the left posterior perianal region, there is   ill-defined soft tissue extending inferiorly to the region of the left   buttocks, where there is a tiny ill-defined area of hypoattenuation   containing a tiny locular measuring approximately 7 mm (2:39), which is   suggestive of small abscess formation,with moderate surrounding soft   tissue swelling. A tiny focus of air is also seen in the left perirenal   region (2:126), suggestive of a fistulous tract. Mild soft tissue   reticulation of the left buttock, with an area of skin thickening   adjacent to the intergluteal cleft (2:141).  BONES: Within normal limits.    IMPRESSION:  Ill-defined soft tissue extending from the left posterior perianal region   to the left buttocks where there is a 7 mm ill-defined hypoattenuating   focus with tiny airsuggestive of small abscess formation. Tiny focus of   air in the left perianal region suggestive of fistulous tract. Mild soft   tissue reticulation of the left buttocks with area of skin thickening   adjacent to the intergluteal cleft.        --- End of Report ---             CONNOR BURGESS MD; Attending Radiologist  This document has been electronically signed. Dec 27 2022  1:35AM    < end of copied text >        ROS  [  ] UNABLE TO ELICIT               HPI:  47 y.o. F with [facility] perirectal abscess s/p I&D Sept '21 presents to [facility] ER c/o perirectal pain with fever. Pt states she had small bump around her rectum on 12/21/22 which become progressively larger and more painful. Pt went to urgent care center and received clindamycin and flagyl, which pt has been taking as instructed. Yesterday pt developed fever of 101F. Pain became unbearable and pt came to [facility] ER. While on stretcher, pt states abscess spontaneously drained, before CT abd/pelvis was performed, giving the pt relief of pain. Pt provided picture of sanitary pad, saturated with seropurulent fluid. This episode similar to 1st episode in September 2021, when pt had large painful L perirectal abscess that PO abx by urgent care did not alleviate. Pt underwent I&D perirectal abscess with fistulotomy with Dr. Duong. Culture taken in OR showed Klebsiella, B Fragilis, and Proteus, which had resistance to some PO abx. Pt followed up 1 month after with no further issues. However, pt notes that in May 2022 she had another episode of similar symptoms, but was treated in urgent care. 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 She presents with a perianal abscess again which started developing 6 days ago and got progressively and had gone to an urgent care center and was started on Clindamycin and Flagyl? which she took for 4 days without relief but her perianal abscess started draining while at home and even before coming here, She has much less pain currently and the fevers she was having seem to have come down. She has no nausea, vomiting or diarrhea, no urinary symptoms, no other complaints.        PAST MEDICAL & SURGICAL HISTORY:  Internal hemorrhoids      Migraine      H/O: hysterectomy          No Known Allergies      Meds:  acetaminophen     Tablet .. 650 milliGRAM(s) Oral every 6 hours PRN  ertapenem  IVPB 1000 milliGRAM(s) IV Intermittent every 24 hours  HYDROmorphone  Injectable 0.5 milliGRAM(s) IV Push every 3 hours PRN  ondansetron Injectable 4 milliGRAM(s) IV Push every 6 hours PRN  oxycodone    5 mG/acetaminophen 325 mG 1 Tablet(s) Oral every 6 hours PRN  sodium chloride 0.45% with potassium chloride 20 mEq/L 1000 milliLiter(s) IV Continuous <Continuous>      SOCIAL HISTORY:  Smoker:  no  ETOH use:  no  Ilicit Drug use:  no    FAMILY HISTORY: not contributory      VITALS:  Vital Signs Last 24 Hrs  T(C): 36.3 (27 Dec 2022 12:48), Max: 37.3 (26 Dec 2022 19:17)  T(F): 97.4 (27 Dec 2022 12:48), Max: 99.1 (26 Dec 2022 19:17)  HR: 76 (27 Dec 2022 12:48) (72 - 98)  BP: 110/66 (27 Dec 2022 12:48) (108/70 - 130/79)  BP(mean): --  RR: 16 (27 Dec 2022 12:48) (16 - 18)  SpO2: 99% (27 Dec 2022 12:48) (99% - 100%)    Parameters below as of 27 Dec 2022 12:48  Patient On (Oxygen Delivery Method): room air        LABS/DIAGNOSTIC TESTS:                          12.8   7.71  )-----------( 225      ( 27 Dec 2022 10:40 )             39.1     WBC Count: 7.71 K/uL (12-27 @ 10:40)  WBC Count: 12.97 K/uL (12-26 @ 21:30)      12-27    141  |  114<H>  |  12  ----------------------------<  107<H>  3.9   |  21<L>  |  0.84    Ca    8.3<L>      27 Dec 2022 10:40  Phos  2.6     12-26  Mg     2.2     12-26    TPro  7.6  /  Alb  3.4<L>  /  TBili  0.5  /  DBili  x   /  AST  14  /  ALT  36  /  AlkPhos  89  12-26          LIVER FUNCTIONS - ( 26 Dec 2022 21:30 )  Alb: 3.4 g/dL / Pro: 7.6 g/dL / ALK PHOS: 89 U/L / ALT: 36 U/L DA / AST: 14 U/L / GGT: x             PT/INR - ( 26 Dec 2022 21:30 )   PT: 14.5 sec;   INR: 1.22 ratio         PTT - ( 26 Dec 2022 21:30 )  PTT:37.0 sec    LACTATE:    ABG -     CULTURES:       RADIOLOGY:< from: CT Abdomen and Pelvis w/ IV Cont (12.27.22 @ 01:01) >    ACC: [id number] EXAM:  CT ABDOMEN AND PELVIS IC                          PROCEDURE DATE:  12/27/2022          INTERPRETATION:  CLINICAL INFORMATION: Question rectal abscess.    COMPARISON: CT pelvis 9/15/2021.    CONTRAST/COMPLICATIONS:  IV Contrast:Omnipaque 350  90 cc administered   10 cc discarded  Oral Contrast: NONE  Complications: None reported at time of study completion    PROCEDURE:  CT of the Abdomen and Pelvis was performed.  Sagittal and coronal reformats were performed.    FINDINGS:  LOWER CHEST: Within normal limits.    LIVER: 1.5 cm hypoattenuating lesion, possible hemangioma. Additional   subcentimeter hypoattenuating lesions too small to characterize.  BILE DUCTS: Normal caliber.  GALLBLADDER: Within normal limits.  SPLEEN: Within normal limits.  PANCREAS: Within normal limits.  ADRENALS: Within normal limits.  KIDNEYS/URETERS: Nonobstructing 2 mm left lower pole renal calculus.   Symmetric enhancement. No hydronephrosis.    BLADDER: Within normal limits.  REPRODUCTIVE ORGANS: Hysterectomy.    BOWEL: No bowel obstruction. Appendix is normal.  PERITONEUM: No ascites.  VESSELS: Within normal limits.  RETROPERITONEUM/LYMPH NODES: No lymphadenopathy.  ABDOMINAL WALL: In the left posterior perianal region, there is   ill-defined soft tissue extending inferiorly to the region of the left   buttocks, where there is a tiny ill-defined area of hypoattenuation   containing a tiny locular measuring approximately 7 mm (2:39), which is   suggestive of small abscess formation,with moderate surrounding soft   tissue swelling. A tiny focus of air is also seen in the left perirenal   region (2:126), suggestive of a fistulous tract. Mild soft tissue   reticulation of the left buttock, with an area of skin thickening   adjacent to the intergluteal cleft (2:141).  BONES: Within normal limits.    IMPRESSION:  Ill-defined soft tissue extending from the left posterior perianal region   to the left buttocks where there is a 7 mm ill-defined hypoattenuating   focus with tiny airsuggestive of small abscess formation. Tiny focus of   air in the left perianal region suggestive of fistulous tract. Mild soft   tissue reticulation of the left buttocks with area of skin thickening   adjacent to the intergluteal cleft.        --- End of Report ---             CONNOR BURGESS MD; Attending Radiologist  This document has been electronically signed. Dec 27 2022  1:35AM    < end of copied text >        ROS  [  ] UNABLE TO ELICIT

## 2022-12-27 NOTE — CONSULT NOTE ADULT - SKIN COMMENTS
Pt examined in the presence of her PCA and her  and son behind the curtain. She has a lump at left buttock region in the anal verge that is solid  at this time without fluctuation and has mild drainage

## 2022-12-28 LAB
A1C WITH ESTIMATED AVERAGE GLUCOSE RESULT: 5.4 % — SIGNIFICANT CHANGE UP (ref 4–5.6)
BASOPHILS # BLD AUTO: 0.02 K/UL — SIGNIFICANT CHANGE UP (ref 0–0.2)
BASOPHILS NFR BLD AUTO: 0.4 % — SIGNIFICANT CHANGE UP (ref 0–2)
EOSINOPHIL # BLD AUTO: 0.29 K/UL — SIGNIFICANT CHANGE UP (ref 0–0.5)
EOSINOPHIL NFR BLD AUTO: 5.6 % — SIGNIFICANT CHANGE UP (ref 0–6)
ESTIMATED AVERAGE GLUCOSE: 108 MG/DL — SIGNIFICANT CHANGE UP (ref 68–114)
HCT VFR BLD CALC: 40.5 % — SIGNIFICANT CHANGE UP (ref 34.5–45)
HGB BLD-MCNC: 12.9 G/DL — SIGNIFICANT CHANGE UP (ref 11.5–15.5)
IMM GRANULOCYTES NFR BLD AUTO: 0.2 % — SIGNIFICANT CHANGE UP (ref 0–0.9)
LYMPHOCYTES # BLD AUTO: 1.46 K/UL — SIGNIFICANT CHANGE UP (ref 1–3.3)
LYMPHOCYTES # BLD AUTO: 28.4 % — SIGNIFICANT CHANGE UP (ref 13–44)
MCHC RBC-ENTMCNC: 29.5 PG — SIGNIFICANT CHANGE UP (ref 27–34)
MCHC RBC-ENTMCNC: 31.9 GM/DL — LOW (ref 32–36)
MCV RBC AUTO: 92.5 FL — SIGNIFICANT CHANGE UP (ref 80–100)
MONOCYTES # BLD AUTO: 0.47 K/UL — SIGNIFICANT CHANGE UP (ref 0–0.9)
MONOCYTES NFR BLD AUTO: 9.1 % — SIGNIFICANT CHANGE UP (ref 2–14)
NEUTROPHILS # BLD AUTO: 2.89 K/UL — SIGNIFICANT CHANGE UP (ref 1.8–7.4)
NEUTROPHILS NFR BLD AUTO: 56.3 % — SIGNIFICANT CHANGE UP (ref 43–77)
NRBC # BLD: 0 /100 WBCS — SIGNIFICANT CHANGE UP (ref 0–0)
PLATELET # BLD AUTO: 236 K/UL — SIGNIFICANT CHANGE UP (ref 150–400)
RBC # BLD: 4.38 M/UL — SIGNIFICANT CHANGE UP (ref 3.8–5.2)
RBC # FLD: 13.1 % — SIGNIFICANT CHANGE UP (ref 10.3–14.5)
WBC # BLD: 5.14 K/UL — SIGNIFICANT CHANGE UP (ref 3.8–10.5)
WBC # FLD AUTO: 5.14 K/UL — SIGNIFICANT CHANGE UP (ref 3.8–10.5)

## 2022-12-28 RX ADMIN — ERTAPENEM SODIUM 120 MILLIGRAM(S): 1 INJECTION, POWDER, LYOPHILIZED, FOR SOLUTION INTRAMUSCULAR; INTRAVENOUS at 05:34

## 2022-12-29 ENCOUNTER — TRANSCRIPTION ENCOUNTER (OUTPATIENT)
Age: 47
End: 2022-12-29

## 2022-12-29 ENCOUNTER — APPOINTMENT (OUTPATIENT)
Dept: SURGERY | Facility: CLINIC | Age: 47
End: 2022-12-29

## 2022-12-29 VITALS
RESPIRATION RATE: 18 BRPM | TEMPERATURE: 98 F | OXYGEN SATURATION: 95 % | DIASTOLIC BLOOD PRESSURE: 63 MMHG | SYSTOLIC BLOOD PRESSURE: 103 MMHG | HEART RATE: 78 BPM

## 2022-12-29 PROCEDURE — 83036 HEMOGLOBIN GLYCOSYLATED A1C: CPT

## 2022-12-29 PROCEDURE — 80053 COMPREHEN METABOLIC PANEL: CPT

## 2022-12-29 PROCEDURE — 85025 COMPLETE CBC W/AUTO DIFF WBC: CPT

## 2022-12-29 PROCEDURE — 36415 COLL VENOUS BLD VENIPUNCTURE: CPT

## 2022-12-29 PROCEDURE — 84702 CHORIONIC GONADOTROPIN TEST: CPT

## 2022-12-29 PROCEDURE — 87040 BLOOD CULTURE FOR BACTERIA: CPT

## 2022-12-29 PROCEDURE — 80048 BASIC METABOLIC PNL TOTAL CA: CPT

## 2022-12-29 PROCEDURE — 74177 CT ABD & PELVIS W/CONTRAST: CPT | Mod: MA

## 2022-12-29 PROCEDURE — 86901 BLOOD TYPING SEROLOGIC RH(D): CPT

## 2022-12-29 PROCEDURE — 84100 ASSAY OF PHOSPHORUS: CPT

## 2022-12-29 PROCEDURE — 87635 SARS-COV-2 COVID-19 AMP PRB: CPT

## 2022-12-29 PROCEDURE — 85730 THROMBOPLASTIN TIME PARTIAL: CPT

## 2022-12-29 PROCEDURE — 86850 RBC ANTIBODY SCREEN: CPT

## 2022-12-29 PROCEDURE — 83735 ASSAY OF MAGNESIUM: CPT

## 2022-12-29 PROCEDURE — 85027 COMPLETE CBC AUTOMATED: CPT

## 2022-12-29 PROCEDURE — 99285 EMERGENCY DEPT VISIT HI MDM: CPT | Mod: 25

## 2022-12-29 PROCEDURE — 86900 BLOOD TYPING SEROLOGIC ABO: CPT

## 2022-12-29 PROCEDURE — 85610 PROTHROMBIN TIME: CPT

## 2022-12-29 RX ORDER — ERTAPENEM SODIUM 1 G/1
1 INJECTION, POWDER, LYOPHILIZED, FOR SOLUTION INTRAMUSCULAR; INTRAVENOUS
Qty: 10 | Refills: 0
Start: 2022-12-29 | End: 2023-01-07

## 2022-12-29 RX ADMIN — ERTAPENEM SODIUM 120 MILLIGRAM(S): 1 INJECTION, POWDER, LYOPHILIZED, FOR SOLUTION INTRAMUSCULAR; INTRAVENOUS at 03:59

## 2022-12-29 NOTE — PROGRESS NOTE ADULT - REASON FOR ADMISSION
Recurrent perirectal abscess

## 2022-12-29 NOTE — DISCHARGE NOTE PROVIDER - HOSPITAL COURSE
47 y.o. F with [facility] perirectal abscess s/p I&D Sept '21 presents to [facility] ER complaining of perirectal pain and fever. Pt states she had small bump perirectal 12/21/22 which become progressively larger and more painful. Yesterday pt developed fever of 101F. Pain became unbearable and pt came to [facility] ER. While on stretcher, pt states abscess   spontaneously drained, before CT abd/pelvis was performed, giving the pt relief of pain. This episode similar to 1st episode in September 2021, when pt had   large painful L perirectal abscess that PO abx by urgent care did not alleviate. Pt underwent I&D perirectal abscess with fistulotomy with Dr. Duong. Culture   taken in OR showed Klebsiella, B Fragilis, and Proteus, which had resistance to some PO abx on previous admission. Pt was admitted to the hospital under Dr. Bhat and treated conservatively with IV Abx being that the abscess was spontaneously draining. Pt was  seen and evaluated by infectious disease who recommended extended dwell placement and 10 more days of IV Abx. Extended dwell was placed, pt tolerated procedure well without complication. Pt was discharged with IV abx, VNS, and appropriate outpt follow up. At the time of discharge, the patient was hemodynamically stable, was tolerating PO diet, afebrile, was ambulating, and was comfortable with adequate pain control. The patient was instructed to follow up with Dr. Bhat within 1-2 weeks after discharge from the hospital. The patient/family felt comfortable with discharge. The patient is stable and ready for discharge to home.

## 2022-12-29 NOTE — DISCHARGE NOTE NURSING/CASE MANAGEMENT/SOCIAL WORK - NSDCPEFALRISK_GEN_ALL_CORE
For information on Fall & Injury Prevention, visit: [email or web address] OR  [email or web address] OR  https://www.cdc.gov/steadi/patient.html

## 2022-12-29 NOTE — DISCHARGE NOTE PROVIDER - NSDCMRMEDTOKEN_GEN_ALL_CORE_FT
Topiramate ER (Eqv-Qudexy XR) 25 mg oral capsule, extended release: 1 cap(s) orally once a day   INVanz 1 g injection: 1 gram(s) intravenously once a day   For 10 days  Last Dose to be given 1/8/23  Topiramate ER (Eqv-Qudexy XR) 25 mg oral capsule, extended release: 1 cap(s) orally once a day

## 2022-12-29 NOTE — PROGRESS NOTE ADULT - NS ATTEND AMEND GEN_ALL_CORE FT
Agree with above, I have seen and examined the patient
Agree with above, I have seen and examined the patient  D/C planning.

## 2022-12-29 NOTE — DISCHARGE NOTE NURSING/CASE MANAGEMENT/SOCIAL WORK - PATIENT PORTAL LINK FT
You can access the FollowMyHealth Patient Portal offered by [facility] by registering at the following website: [email or web address]. By joining [facility]’s FollowMyHealth portal, you will also be able to view your health information using other applications (apps) compatible with our system.

## 2022-12-29 NOTE — PROGRESS NOTE ADULT - SUBJECTIVE AND OBJECTIVE BOX
INTERVAL HPI/OVERNIGHT EVENTS:  No acute events overnight. Pt resting comfortably, feeling better    MEDICATIONS  (STANDING):  ertapenem  IVPB 1000 milliGRAM(s) IV Intermittent every 24 hours  sodium chloride 0.45% with potassium chloride 20 mEq/L 1000 milliLiter(s) (100 mL/Hr) IV Continuous <Continuous>    MEDICATIONS  (PRN):  acetaminophen     Tablet .. 650 milliGRAM(s) Oral every 6 hours PRN Temp greater or equal to 38C (100.4F), Mild Pain (1 - 3)  HYDROmorphone  Injectable 0.5 milliGRAM(s) IV Push every 3 hours PRN Severe Pain (7 - 10)  ondansetron Injectable 4 milliGRAM(s) IV Push every 6 hours PRN Nausea  oxycodone    5 mG/acetaminophen 325 mG 1 Tablet(s) Oral every 6 hours PRN Moderate Pain (4 - 6)    Vital Signs Last 24 Hrs  T(C): 36.6 (28 Dec 2022 05:48), Max: 36.6 (28 Dec 2022 05:48)  T(F): 97.8 (28 Dec 2022 05:48), Max: 97.8 (28 Dec 2022 05:48)  HR: 91 (28 Dec 2022 05:48) (75 - 91)  BP: 112/54 (28 Dec 2022 05:48) (110/66 - 112/54)  RR: 16 (28 Dec 2022 05:48) (16 - 16)  SpO2: 98% (28 Dec 2022 05:48) (98% - 100%)    Parameters below as of 28 Dec 2022 05:48  Patient On (Oxygen Delivery Method): room air    Physical:  General: Alert and oriented, not in acute distress  Resp: Breathing unlabored  Buttock: left perirectal region with small 5cm area of induration with central erythema without active drainage   Extremities: No pedal edema    LABS:              12.9   5.14  )-----------( 236      ( 28 Dec 2022 05:48 )             40.5             12-27    141  |  114<H>  |  12  ----------------------------<  107<H>  3.9   |  21<L>  |  0.84    Ca    8.3<L>      27 Dec 2022 10:40  Phos  2.6     12-26  Mg     2.2     12-26    TPro  7.6  /  Alb  3.4<L>  /  TBili  0.5  /  DBili  x   /  AST  14  /  ALT  36  /  AlkPhos  89  12-26
47y Female    Meds:  ertapenem  IVPB 1000 milliGRAM(s) IV Intermittent every 24 hours    Allergies    No Known Allergies    Intolerances        VITALS:  Vital Signs Last 24 Hrs  T(C): 36.5 (29 Dec 2022 13:29), Max: 36.5 (28 Dec 2022 21:34)  T(F): 97.7 (29 Dec 2022 13:29), Max: 97.7 (28 Dec 2022 21:34)  HR: 78 (29 Dec 2022 13:29) (78 - 97)  BP: 103/63 (29 Dec 2022 13:29) (103/63 - 111/69)  BP(mean): --  RR: 18 (29 Dec 2022 13:29) (16 - 18)  SpO2: 95% (29 Dec 2022 13:29) (95% - 98%)    Parameters below as of 29 Dec 2022 13:29  Patient On (Oxygen Delivery Method): room air        LABS/DIAGNOSTIC TESTS:                          12.9   5.14  )-----------( 236      ( 28 Dec 2022 05:48 )             40.5                     CULTURES: .Blood Blood-Peripheral  12-26 @ 21:30   No growth to date.  --  --      .Blood Blood-Peripheral  12-26 @ 21:25   No growth to date.  --  --            RADIOLOGY:      ROS:  [  ] UNABLE TO ELICIT
INTERVAL HPI/OVERNIGHT EVENTS:  Pt seen and examined at bedside.   Pt resting comfortably. No acute complaints.   Tolerating diet.   Afebrile  Feeling well    MEDICATIONS  (STANDING):  ertapenem  IVPB 1000 milliGRAM(s) IV Intermittent every 24 hours  sodium chloride 0.45% with potassium chloride 20 mEq/L 1000 milliLiter(s) (100 mL/Hr) IV Continuous <Continuous>    MEDICATIONS  (PRN):  acetaminophen     Tablet .. 650 milliGRAM(s) Oral every 6 hours PRN Temp greater or equal to 38C (100.4F), Mild Pain (1 - 3)  HYDROmorphone  Injectable 0.5 milliGRAM(s) IV Push every 3 hours PRN Severe Pain (7 - 10)  ondansetron Injectable 4 milliGRAM(s) IV Push every 6 hours PRN Nausea  oxycodone    5 mG/acetaminophen 325 mG 1 Tablet(s) Oral every 6 hours PRN Moderate Pain (4 - 6)      Vital Signs Last 24 Hrs  T(C): 36.5 (29 Dec 2022 05:31), Max: 36.8 (28 Dec 2022 13:20)  T(F): 97.7 (29 Dec 2022 05:31), Max: 98.2 (28 Dec 2022 13:20)  HR: 78 (29 Dec 2022 05:31) (78 - 97)  BP: 111/69 (29 Dec 2022 05:31) (104/62 - 111/69)  BP(mean): --  RR: 18 (29 Dec 2022 05:31) (16 - 18)  SpO2: 98% (29 Dec 2022 05:31) (97% - 100%)    Parameters below as of 29 Dec 2022 05:31  Patient On (Oxygen Delivery Method): room air        Physical:  General: A&Ox3. NAD.  Respirations: Unlabored   Buttock: left perirectal region with small 5cm area of induration with central erythema without active drainage     I&O's Detail      LABS:                        12.9   5.14  )-----------( 236      ( 28 Dec 2022 05:48 )             40.5             12-27    141  |  114<H>  |  12  ----------------------------<  107<H>  3.9   |  21<L>  |  0.84    Ca    8.3<L>      27 Dec 2022 10:40        
6cm 20 Gauge Extended Dwell Catheter inserted via the left arm basillic vein.  Good blood flow, dressing applied.  Maximum catheter dwell time is <29 days. May use immediately. Dressing change as needed   
  pt seen in icu [  ], reg med floor [x   ], bed [x  ], chair at bedside [   ]  Patient is resting comfortable in bed in NAD  REVIEW OF SYSTEMS:    CONSTITUTIONAL: No weakness, fevers or chills  EYES/ENT: No visual changes;  No vertigo or throat pain   NECK: No pain or stiffness  RESPIRATORY: No cough, wheezing, hemoptysis; No shortness of breath  CARDIOVASCULAR: No chest pain or palpitations  GASTROINTESTINAL: No abdominal or epigastric pain. No nausea, vomiting, or hematemesis; No diarrhea or constipation. No melena or hematochezia.  GENITOURINARY: No dysuria, frequency or hematuria  NEUROLOGICAL: No numbness or weakness  SKIN: No itching, burning, rashes, or lesions   All other review of systems is negative unless indicated above.    Physical Exam    General: WN/WD NAD  Neurology: A&Ox3, nonfocal, ROSADO x 4  Respiratory: CTA B/L  CV: RRR, S1S2, no murmurs, rubs or gallops  Abdominal: Soft, NT, ND +BS, Last BM  Extremities: No edema, + peripheral pulses      Allergies  No Known Allergies      Health Issues  Abscess of rectum    Internal hemorrhoids    Migraine    H/O: hysterectomy        Vitals  T(F): 97.8 (12-28-22 @ 05:48), Max: 97.8 (12-28-22 @ 05:48)  HR: 91 (12-28-22 @ 05:48) (75 - 91)  BP: 112/54 (12-28-22 @ 05:48) (110/66 - 112/54)  RR: 16 (12-28-22 @ 05:48) (16 - 16)  SpO2: 98% (12-28-22 @ 05:48) (98% - 100%)  Wt(kg): --  CAPILLARY BLOOD GLUCOSE          Labs                          12.9   5.14  )-----------( 236      ( 28 Dec 2022 05:48 )             40.5       12-27    141  |  114<H>  |  12  ----------------------------<  107<H>  3.9   |  21<L>  |  0.84    Ca    8.3<L>      27 Dec 2022 10:40  Phos  2.6     12-26  Mg     2.2     12-26    TPro  7.6  /  Alb  3.4<L>  /  TBili  0.5  /  DBili  x   /  AST  14  /  ALT  36  /  AlkPhos  89  12-26      Culture - Blood (12.26.22 @ 21:30)   Specimen Source: .Blood Blood-Peripheral   Culture Results:   No growth to date. Culture - Blood (12.26.22 @ 21:25)   Specimen Source: .Blood Blood-Peripheral   Culture Results:   No growth to date. Culture Results:   Moderate Klebsiella pneumoniae   Numerous Proteus mirabilis   Numerous Bacteroides fragilis "Susceptibilities not performed"   Organism Identification: Klebsiella pneumoniae   Proteus mirabilis   Organism: Klebsiella pneumoniae   Organism: Proteus mirabilis       Radiology Results      Meds    MEDICATIONS  (STANDING):  ertapenem  IVPB 1000 milliGRAM(s) IV Intermittent every 24 hours  sodium chloride 0.45% with potassium chloride 20 mEq/L 1000 milliLiter(s) (100 mL/Hr) IV Continuous <Continuous>      MEDICATIONS  (PRN):  acetaminophen     Tablet .. 650 milliGRAM(s) Oral every 6 hours PRN Temp greater or equal to 38C (100.4F), Mild Pain (1 - 3)  HYDROmorphone  Injectable 0.5 milliGRAM(s) IV Push every 3 hours PRN Severe Pain (7 - 10)  ondansetron Injectable 4 milliGRAM(s) IV Push every 6 hours PRN Nausea  oxycodone    5 mG/acetaminophen 325 mG 1 Tablet(s) Oral every 6 hours PRN Moderate Pain (4 - 6)      
INTERVAL HPI/OVERNIGHT EVENTS:  Pt seen and examined at bedside. No acute complaints  Perirectal pain improving  Afebrile    MEDICATIONS  (STANDING):  ertapenem  IVPB 1000 milliGRAM(s) IV Intermittent every 24 hours  potassium chloride    Tablet ER 40 milliEquivalent(s) Oral every 4 hours  sodium chloride 0.45% with potassium chloride 20 mEq/L 1000 milliLiter(s) (100 mL/Hr) IV Continuous <Continuous>    MEDICATIONS  (PRN):  acetaminophen     Tablet .. 650 milliGRAM(s) Oral every 6 hours PRN Temp greater or equal to 38C (100.4F), Mild Pain (1 - 3)  HYDROmorphone  Injectable 0.5 milliGRAM(s) IV Push every 3 hours PRN Severe Pain (7 - 10)  ondansetron Injectable 4 milliGRAM(s) IV Push every 6 hours PRN Nausea  oxycodone    5 mG/acetaminophen 325 mG 1 Tablet(s) Oral every 6 hours PRN Moderate Pain (4 - 6)      Vital Signs Last 24 Hrs  T(C): 36.8 (27 Dec 2022 06:45), Max: 37.3 (26 Dec 2022 19:17)  T(F): 98.3 (27 Dec 2022 06:45), Max: 99.1 (26 Dec 2022 19:17)  HR: 76 (27 Dec 2022 06:45) (72 - 98)  BP: 125/80 (27 Dec 2022 06:45) (108/70 - 130/79)  RR: 18 (27 Dec 2022 06:45) (18 - 18)  SpO2: 99% (27 Dec 2022 06:45) (99% - 100%)    Parameters below as of 27 Dec 2022 06:45  Patient On (Oxygen Delivery Method): room air    Physical:  General: A&Ox3. NAD  Chest: respiration unlabored  Abdomen: Soft nondistended, nontender  Rectal:  Minimal blood noted in perirectal region.  Induration and mild tenderness in the 3 o'clock region. No purulent drainage noted.    I&O's Detail    26 Dec 2022 07:01  -  27 Dec 2022 07:00  --------------------------------------------------------  IN:    sodium chloride 0.45% w/ Additives: 200 mL  Total IN: 200 mL    OUT:  Total OUT: 0 mL    Total NET: 200 mL    LABS:                        13.8   12.97 )-----------( 237      ( 26 Dec 2022 21:30 )             42.4             12-26    142  |  113<H>  |  16  ----------------------------<  124<H>  3.0<L>   |  19<L>  |  1.08    Ca    8.7      26 Dec 2022 21:30  Phos  2.6     12-26  Mg     2.2     12-26    TPro  7.6  /  Alb  3.4<L>  /  TBili  0.5  /  DBili  x   /  AST  14  /  ALT  36  /  AlkPhos  89  12-26  
  pt seen in icu [  ], reg med floor [ x  ], bed [ x ], chair at bedside [   ]  Awake, alert, comfortable in bed in NAD. S/p picc line placement for prolonged Atbs tx  REVIEW OF SYSTEMS:    CONSTITUTIONAL: No weakness, fevers or chills  EYES/ENT: No visual changes;  No vertigo or throat pain   NECK: No pain or stiffness  RESPIRATORY: No cough, wheezing, hemoptysis; No shortness of breath  CARDIOVASCULAR: No chest pain or palpitations  GASTROINTESTINAL: No abdominal or epigastric pain. No nausea, vomiting, or hematemesis; No diarrhea or constipation. No melena or hematochezia.  GENITOURINARY: No dysuria, frequency or hematuria  NEUROLOGICAL: No numbness or weakness  SKIN: No itching, burning, rashes, or lesions   All other review of systems is negative unless indicated above.    Physical Exam    General: WN/WD NAD  Neurology: A&Ox3, nonfocal, ROSADO x 4  Respiratory: CTA B/L  CV: RRR, S1S2, no murmurs, rubs or gallops  Abdominal: Soft, NT, ND +BS, Last BM  Extremities: No edema, + peripheral pulses      Allergies  No Known Allergies      Health Issues  Abscess of rectum    Internal hemorrhoids    Migraine    H/O: hysterectomy        Vitals  T(F): 97.7 (12-29-22 @ 05:31), Max: 98.2 (12-28-22 @ 13:20)  HR: 78 (12-29-22 @ 05:31) (78 - 97)  BP: 111/69 (12-29-22 @ 05:31) (104/62 - 111/69)  RR: 18 (12-29-22 @ 05:31) (16 - 18)  SpO2: 98% (12-29-22 @ 05:31) (97% - 100%)  Wt(kg): --  CAPILLARY BLOOD GLUCOSE          Labs                          12.9   5.14  )-----------( 236      ( 28 Dec 2022 05:48 )             40.5                     Radiology Results      Meds    MEDICATIONS  (STANDING):  ertapenem  IVPB 1000 milliGRAM(s) IV Intermittent every 24 hours  sodium chloride 0.45% with potassium chloride 20 mEq/L 1000 milliLiter(s) (100 mL/Hr) IV Continuous <Continuous>      MEDICATIONS  (PRN):  acetaminophen     Tablet .. 650 milliGRAM(s) Oral every 6 hours PRN Temp greater or equal to 38C (100.4F), Mild Pain (1 - 3)  HYDROmorphone  Injectable 0.5 milliGRAM(s) IV Push every 3 hours PRN Severe Pain (7 - 10)  ondansetron Injectable 4 milliGRAM(s) IV Push every 6 hours PRN Nausea  oxycodone    5 mG/acetaminophen 325 mG 1 Tablet(s) Oral every 6 hours PRN Moderate Pain (4 - 6)

## 2022-12-29 NOTE — DISCHARGE NOTE PROVIDER - CARE PROVIDERS DIRECT ADDRESSES
[email or web address]

## 2022-12-29 NOTE — DISCHARGE NOTE PROVIDER - NSDCCPCAREPLAN_GEN_ALL_CORE_FT
PRINCIPAL DISCHARGE DIAGNOSIS  Diagnosis: Perirectal abscess  Assessment and Plan of Treatment: Please follow-up with your surgeon in 1 week. Drink plenty of fluids and rest as needed. Call for any fever over 101, nausea, vomiting, severe pain, no passing of gas or bowel movement.   DIET  You may resume your regular diet as normal.   PAIN CONTROL  You may take Motrin 600mg (with food) or Tylenol 650mg as needed for mild pain. Stagger one medication 3 hours after the other for maximum pain control. Maximum daily dose of Tylenol should not exceed 4grams/day.   ANTIBIOTICS  You have been prescribed 10 days of IV abx through the extended dwell that was placed. Please complete full course as prescribed.       PRINCIPAL DISCHARGE DIAGNOSIS  Diagnosis: Perirectal abscess  Assessment and Plan of Treatment: Please follow-up with your surgeon in 1 week. Drink plenty of fluids and rest as needed. Call for any fever over 101, nausea, vomiting, severe pain, no passing of gas or bowel movement.   DIET  You may resume your regular diet as normal.   PAIN CONTROL  You may take Motrin 600mg (with food) or Tylenol 650mg as needed for mild pain. Stagger one medication 3 hours after the other for maximum pain control. Maximum daily dose of Tylenol should not exceed 4grams/day.   ANTIBIOTICS  You have been prescribed 10 days of IV abx through the extended dwell that was placed. Please complete full course as prescribed. Extended dwell intravenous line to be removed on 1/7/22.

## 2022-12-29 NOTE — PROGRESS NOTE ADULT - ASSESSMENT
47 y.o. F with recurrent perirectal abscess  Afebrile    -F/u AM labs  -ID consult, Dr. Rosales  -Pain control PRN  -Discussed with Dr. Hayden     
47F with recurrent perirectal abscess, afebrile, vitals stable  clinically improving  s/p extended dwell placement by IR 12/28    -Continue IV abx, extended dwell 1g Invanz x 10 days  -Pain control PRN  -Dc home   -Discussed with Dr. Hayden     
47F with recurrent perirectal abscess, afebrile, vitals stable  clinically improving    - F/u AM labs  - Continue IV abx: f/u ID recs: po vs iv abx upon discharge  - Pain control PRN  - Discussed with Dr. Hayden

## 2022-12-29 NOTE — DISCHARGE NOTE PROVIDER - CARE PROVIDER_API CALL
Vu Hayden)  Surgery  [address] [facility], [place] [address]  Phone: [phone]  Fax: [phone]  Follow Up Time: 2 weeks

## 2022-12-30 RX ORDER — ERTAPENEM SODIUM 1 G/1
1 INJECTION, POWDER, LYOPHILIZED, FOR SOLUTION INTRAMUSCULAR; INTRAVENOUS
Qty: 10 | Refills: 0
Start: 2022-12-30 | End: 2023-01-08

## 2023-01-01 LAB
CULTURE RESULTS: SIGNIFICANT CHANGE UP
CULTURE RESULTS: SIGNIFICANT CHANGE UP
SPECIMEN SOURCE: SIGNIFICANT CHANGE UP
SPECIMEN SOURCE: SIGNIFICANT CHANGE UP

## 2023-01-05 ENCOUNTER — APPOINTMENT (OUTPATIENT)
Dept: SURGERY | Facility: CLINIC | Age: 48
End: 2023-01-05
Payer: MEDICAID

## 2023-01-09 ENCOUNTER — APPOINTMENT (OUTPATIENT)
Dept: SURGERY | Facility: CLINIC | Age: 48
End: 2023-01-09
Payer: MEDICAID

## 2023-01-09 VITALS
HEIGHT: 64 IN | TEMPERATURE: 98 F | SYSTOLIC BLOOD PRESSURE: 111 MMHG | DIASTOLIC BLOOD PRESSURE: 73 MMHG | WEIGHT: 120 LBS | BODY MASS INDEX: 20.49 KG/M2

## 2023-01-09 DIAGNOSIS — K61.1 RECTAL ABSCESS: ICD-10-CM

## 2023-01-09 DIAGNOSIS — Z78.9 OTHER SPECIFIED HEALTH STATUS: ICD-10-CM

## 2023-01-09 DIAGNOSIS — G43.909 MIGRAINE, UNSPECIFIED, NOT INTRACTABLE, W/OUT STATUS MIGRAINOSUS: ICD-10-CM

## 2023-01-09 PROCEDURE — 99213 OFFICE O/P EST LOW 20 MIN: CPT

## 2023-01-09 NOTE — PLAN
[FreeTextEntry1] : Patient with symptomatic anal fistula. Has recurrent pain and drainage from the area. Had a long d/w the pt. All the options, benefits and risks of the surgery were discussed. \par \par Discussed with the patient the anatomy of the area, the pathophysiology of the disease process and the rationale for surgery. The small potential for anal leakage, recurrence, bleeding and other related complications were discussed. \par The attendant risks, possible complications and expected postoperative course have been discussed in detail. I have given the patient the opportunity to ask questions, and all questions have been answered to the patient's satisfaction. \par \par Patient will monitor /observe symptoms at this time, and follow up in 2-3 months\par She was advised to return to the office for a follow up visit sooner if she has new onset /worsening of symptoms. \par

## 2023-01-09 NOTE — DATA REVIEWED
[FreeTextEntry1] :   CT ABDOMEN AND PELVIS IC                      \par \par PROCEDURE DATE:  12/27/2022  \par \par INTERPRETATION:  CLINICAL INFORMATION: Question rectal abscess.\par \par COMPARISON: CT pelvis 9/15/2021.\par \par CONTRAST/COMPLICATIONS:\par IV Contrast: Omnipaque 350  90 cc administered   10 cc discarded\par Oral Contrast: NONE\par Complications: None reported at time of study completion\par \par PROCEDURE:\par CT of the Abdomen and Pelvis was performed.\par Sagittal and coronal reformats were performed.\par \par FINDINGS:\par LOWER CHEST: Within normal limits.\par \par LIVER: 1.5 cm hypoattenuating lesion, possible hemangioma. Additional \par subcentimeter hypoattenuating lesions too small to characterize.\par BILE DUCTS: Normal caliber.\par GALLBLADDER: Within normal limits.\par SPLEEN: Within normal limits.\par PANCREAS: Within normal limits.\par ADRENALS: Within normal limits.\par KIDNEYS/URETERS: Nonobstructing 2 mm left lower pole renal calculus. \par Symmetric enhancement. No hydronephrosis.\par \par BLADDER: Within normal limits.\par REPRODUCTIVE ORGANS: Hysterectomy.\par \par BOWEL: No bowel obstruction. Appendix is normal.\par PERITONEUM: No ascites.\par VESSELS: Within normal limits.\par RETROPERITONEUM/LYMPH NODES: No lymphadenopathy.\par ABDOMINAL WALL: In the left posterior perianal region, there is \par ill-defined soft tissue extending inferiorly to the region of the left \par buttocks, where there is a tiny ill-defined area of hypoattenuation \par containing a tiny locular measuring approximately 7 mm (2:39), which is \par suggestive of small abscess formation, with moderate surrounding soft \par tissue swelling. A tiny focus of air is also seen in the left perirenal \par region (2:126), suggestive of a fistulous tract. Mild soft tissue \par reticulation of the left buttock, with an area of skin thickening \par adjacent to the intergluteal cleft (2:141).\par BONES: Within normal limits.\par \par IMPRESSION:\par Ill-defined soft tissue extending from the left posterior perianal region \par to the left buttocks where there is a 7 mm ill-defined hypoattenuating \par focus with tiny air suggestive of small abscess formation. Tiny focus of \par air in the left perianal region suggestive of fistulous tract. Mild soft \par tissue reticulation of the left buttocks with area of skin thickening \par adjacent to the intergluteal cleft.\par \par --- End of Report ---\par

## 2023-01-09 NOTE — CONSULT LETTER
[Dear  ___] : Dear  [unfilled], [Consult Letter:] : I had the pleasure of evaluating your patient, [unfilled]. [Consult Closing:] : Thank you very much for allowing me to participate in the care of this patient.  If you have any questions, please do not hesitate to contact me. [Sincerely,] : Sincerely, [FreeTextEntry3] : Darshan Duong MD\par

## 2023-01-09 NOTE — HISTORY OF PRESENT ILLNESS
[de-identified] : Ms. BARBER is a 47 year y/o F who was admitted to [facility] 12/27- 12/29/22 for recurrent perirectal abscess. Patient states she has had multiple recurrences, with symptoms first appearing 09/2021. She has had symptoms, on and off, since that time. She has history of incision and drainage to the area, and states the wound has not completely healed.

## 2023-01-09 NOTE — PHYSICAL EXAM
[Normal] : was normal [None] : there was no rectal mass  [No Rash or Lesion] : No rash or lesion [Alert] : alert [Oriented to Person] : oriented to person [Oriented to Place] : oriented to place [Oriented to Time] : oriented to time [Calm] : calm [de-identified] : A/Ox3; NAD. appears comfortable [de-identified] : EOMI; sclera anicteric. [de-identified] : no cervical lymphadenopathy  [de-identified] : airway patent, no use of accessory muscles [de-identified] : abd is soft, NT/ND\par  [de-identified] : +ROM, normal gait

## 2023-01-09 NOTE — ASSESSMENT
[FreeTextEntry1] : Ms. BARBER is a 47 year y/o F who presents for follow up visit for recurrent perirectal abscess. On exam, she has minimal tenderness, no drainage seen. Results of recent imaging reviewed.

## 2024-04-10 NOTE — ED ADULT NURSE NOTE - NS ED NURSE DISCH DISPOSITION
Mom states patient has had 3 episodes of diarrhea that isn't her norm. Please call to discuss when available.  
Discharged

## 2024-06-12 ENCOUNTER — EMERGENCY (EMERGENCY)
Facility: HOSPITAL | Age: 49
LOS: 1 days | Discharge: HOME | End: 2024-06-12
Attending: STUDENT IN AN ORGANIZED HEALTH CARE EDUCATION/TRAINING PROGRAM
Payer: COMMERCIAL

## 2024-06-12 VITALS
OXYGEN SATURATION: 98 % | SYSTOLIC BLOOD PRESSURE: 117 MMHG | TEMPERATURE: 98 F | WEIGHT: 112.44 LBS | HEIGHT: 62 IN | DIASTOLIC BLOOD PRESSURE: 81 MMHG | RESPIRATION RATE: 18 BRPM | HEART RATE: 71 BPM

## 2024-06-12 VITALS
TEMPERATURE: 98 F | RESPIRATION RATE: 18 BRPM | SYSTOLIC BLOOD PRESSURE: 112 MMHG | OXYGEN SATURATION: 100 % | HEART RATE: 76 BPM | DIASTOLIC BLOOD PRESSURE: 78 MMHG

## 2024-06-12 DIAGNOSIS — Z90.710 ACQUIRED ABSENCE OF BOTH CERVIX AND UTERUS: Chronic | ICD-10-CM

## 2024-06-12 LAB
ALBUMIN SERPL ELPH-MCNC: 3.6 G/DL — SIGNIFICANT CHANGE UP (ref 3.5–5)
ALP SERPL-CCNC: 84 U/L — SIGNIFICANT CHANGE UP (ref 40–120)
ALT FLD-CCNC: 36 U/L DA — SIGNIFICANT CHANGE UP (ref 10–60)
ANION GAP SERPL CALC-SCNC: 5 MMOL/L — SIGNIFICANT CHANGE UP (ref 5–17)
APPEARANCE UR: ABNORMAL
AST SERPL-CCNC: 16 U/L — SIGNIFICANT CHANGE UP (ref 10–40)
BACTERIA # UR AUTO: ABNORMAL /HPF
BASE EXCESS BLDV CALC-SCNC: -6.1 MMOL/L — SIGNIFICANT CHANGE UP
BASOPHILS # BLD AUTO: 0.04 K/UL — SIGNIFICANT CHANGE UP (ref 0–0.2)
BASOPHILS NFR BLD AUTO: 0.4 % — SIGNIFICANT CHANGE UP (ref 0–2)
BILIRUB DIRECT SERPL-MCNC: 0.1 MG/DL — SIGNIFICANT CHANGE UP (ref 0–0.3)
BILIRUB INDIRECT FLD-MCNC: 0.3 MG/DL — SIGNIFICANT CHANGE UP (ref 0.2–1)
BILIRUB SERPL-MCNC: 0.4 MG/DL — SIGNIFICANT CHANGE UP (ref 0.2–1.2)
BILIRUB UR-MCNC: NEGATIVE — SIGNIFICANT CHANGE UP
BLD GP AB SCN SERPL QL: SIGNIFICANT CHANGE UP
BUN SERPL-MCNC: 21 MG/DL — HIGH (ref 7–18)
CALCIUM SERPL-MCNC: 8.5 MG/DL — SIGNIFICANT CHANGE UP (ref 8.4–10.5)
CHLORIDE SERPL-SCNC: 113 MMOL/L — HIGH (ref 96–108)
CO2 SERPL-SCNC: 23 MMOL/L — SIGNIFICANT CHANGE UP (ref 22–31)
COLOR SPEC: ABNORMAL
CREAT SERPL-MCNC: 1.31 MG/DL — HIGH (ref 0.5–1.3)
DIFF PNL FLD: ABNORMAL
EGFR: 51 ML/MIN/1.73M2 — LOW
EOSINOPHIL # BLD AUTO: 0.4 K/UL — SIGNIFICANT CHANGE UP (ref 0–0.5)
EOSINOPHIL NFR BLD AUTO: 4 % — SIGNIFICANT CHANGE UP (ref 0–6)
EPI CELLS # UR: PRESENT
GLUCOSE SERPL-MCNC: 100 MG/DL — HIGH (ref 70–99)
GLUCOSE UR QL: NEGATIVE MG/DL — SIGNIFICANT CHANGE UP
HCO3 BLDV-SCNC: 21 MMOL/L — LOW (ref 22–29)
HCT VFR BLD CALC: 39.6 % — SIGNIFICANT CHANGE UP (ref 34.5–45)
HGB BLD-MCNC: 13.2 G/DL — SIGNIFICANT CHANGE UP (ref 11.5–15.5)
IMM GRANULOCYTES NFR BLD AUTO: 0.2 % — SIGNIFICANT CHANGE UP (ref 0–0.9)
KETONES UR-MCNC: ABNORMAL MG/DL
LACTATE SERPL-SCNC: 1.2 MMOL/L — SIGNIFICANT CHANGE UP (ref 0.7–2)
LEUKOCYTE ESTERASE UR-ACNC: ABNORMAL
LIDOCAIN IGE QN: 74 U/L — SIGNIFICANT CHANGE UP (ref 13–75)
LYMPHOCYTES # BLD AUTO: 1.83 K/UL — SIGNIFICANT CHANGE UP (ref 1–3.3)
LYMPHOCYTES # BLD AUTO: 18.1 % — SIGNIFICANT CHANGE UP (ref 13–44)
MCHC RBC-ENTMCNC: 31 PG — SIGNIFICANT CHANGE UP (ref 27–34)
MCHC RBC-ENTMCNC: 33.3 GM/DL — SIGNIFICANT CHANGE UP (ref 32–36)
MCV RBC AUTO: 93 FL — SIGNIFICANT CHANGE UP (ref 80–100)
MONOCYTES # BLD AUTO: 1.1 K/UL — HIGH (ref 0–0.9)
MONOCYTES NFR BLD AUTO: 10.9 % — SIGNIFICANT CHANGE UP (ref 2–14)
NEUTROPHILS # BLD AUTO: 6.72 K/UL — SIGNIFICANT CHANGE UP (ref 1.8–7.4)
NEUTROPHILS NFR BLD AUTO: 66.4 % — SIGNIFICANT CHANGE UP (ref 43–77)
NITRITE UR-MCNC: NEGATIVE — SIGNIFICANT CHANGE UP
NRBC # BLD: 0 /100 WBCS — SIGNIFICANT CHANGE UP (ref 0–0)
PCO2 BLDV: 47 MMHG — HIGH (ref 39–42)
PH BLDV: 7.26 — LOW (ref 7.32–7.43)
PH UR: 6 — SIGNIFICANT CHANGE UP (ref 5–8)
PLATELET # BLD AUTO: 215 K/UL — SIGNIFICANT CHANGE UP (ref 150–400)
PO2 BLDV: 31 MMHG — SIGNIFICANT CHANGE UP
POTASSIUM SERPL-MCNC: 3.5 MMOL/L — SIGNIFICANT CHANGE UP (ref 3.5–5.3)
POTASSIUM SERPL-SCNC: 3.5 MMOL/L — SIGNIFICANT CHANGE UP (ref 3.5–5.3)
PROT SERPL-MCNC: 7.1 G/DL — SIGNIFICANT CHANGE UP (ref 6–8.3)
PROT UR-MCNC: 30 MG/DL
RBC # BLD: 4.26 M/UL — SIGNIFICANT CHANGE UP (ref 3.8–5.2)
RBC # FLD: 12.4 % — SIGNIFICANT CHANGE UP (ref 10.3–14.5)
RBC CASTS # UR COMP ASSIST: ABNORMAL /HPF
SAO2 % BLDV: 51 % — SIGNIFICANT CHANGE UP
SODIUM SERPL-SCNC: 141 MMOL/L — SIGNIFICANT CHANGE UP (ref 135–145)
SP GR SPEC: 1.02 — SIGNIFICANT CHANGE UP (ref 1–1.03)
UROBILINOGEN FLD QL: 1 MG/DL — SIGNIFICANT CHANGE UP (ref 0.2–1)
WBC # BLD: 10.11 K/UL — SIGNIFICANT CHANGE UP (ref 3.8–10.5)
WBC # FLD AUTO: 10.11 K/UL — SIGNIFICANT CHANGE UP (ref 3.8–10.5)
WBC UR QL: 6 /HPF — HIGH (ref 0–5)

## 2024-06-12 PROCEDURE — 74176 CT ABD & PELVIS W/O CONTRAST: CPT | Mod: 26,MC

## 2024-06-12 PROCEDURE — 87086 URINE CULTURE/COLONY COUNT: CPT

## 2024-06-12 PROCEDURE — 36415 COLL VENOUS BLD VENIPUNCTURE: CPT

## 2024-06-12 PROCEDURE — 82803 BLOOD GASES ANY COMBINATION: CPT

## 2024-06-12 PROCEDURE — 80076 HEPATIC FUNCTION PANEL: CPT

## 2024-06-12 PROCEDURE — 86850 RBC ANTIBODY SCREEN: CPT

## 2024-06-12 PROCEDURE — 80048 BASIC METABOLIC PNL TOTAL CA: CPT

## 2024-06-12 PROCEDURE — 85025 COMPLETE CBC W/AUTO DIFF WBC: CPT

## 2024-06-12 PROCEDURE — 86900 BLOOD TYPING SEROLOGIC ABO: CPT

## 2024-06-12 PROCEDURE — 96374 THER/PROPH/DIAG INJ IV PUSH: CPT

## 2024-06-12 PROCEDURE — 83605 ASSAY OF LACTIC ACID: CPT

## 2024-06-12 PROCEDURE — 81001 URINALYSIS AUTO W/SCOPE: CPT

## 2024-06-12 PROCEDURE — 83690 ASSAY OF LIPASE: CPT

## 2024-06-12 PROCEDURE — 86901 BLOOD TYPING SEROLOGIC RH(D): CPT

## 2024-06-12 PROCEDURE — 99285 EMERGENCY DEPT VISIT HI MDM: CPT

## 2024-06-12 PROCEDURE — 74176 CT ABD & PELVIS W/O CONTRAST: CPT | Mod: MC

## 2024-06-12 PROCEDURE — 99284 EMERGENCY DEPT VISIT MOD MDM: CPT | Mod: 25

## 2024-06-12 PROCEDURE — 84702 CHORIONIC GONADOTROPIN TEST: CPT

## 2024-06-12 PROCEDURE — 87040 BLOOD CULTURE FOR BACTERIA: CPT

## 2024-06-12 PROCEDURE — 96375 TX/PRO/DX INJ NEW DRUG ADDON: CPT

## 2024-06-12 RX ORDER — SODIUM CHLORIDE 9 MG/ML
1000 INJECTION INTRAMUSCULAR; INTRAVENOUS; SUBCUTANEOUS ONCE
Refills: 0 | Status: COMPLETED | OUTPATIENT
Start: 2024-06-12 | End: 2024-06-12

## 2024-06-12 RX ORDER — MORPHINE SULFATE 50 MG/1
4 CAPSULE, EXTENDED RELEASE ORAL ONCE
Refills: 0 | Status: DISCONTINUED | OUTPATIENT
Start: 2024-06-12 | End: 2024-06-12

## 2024-06-12 RX ORDER — KETOROLAC TROMETHAMINE 30 MG/ML
15 SYRINGE (ML) INJECTION ONCE
Refills: 0 | Status: DISCONTINUED | OUTPATIENT
Start: 2024-06-12 | End: 2024-06-12

## 2024-06-12 RX ORDER — FAMOTIDINE 10 MG/ML
20 INJECTION INTRAVENOUS ONCE
Refills: 0 | Status: COMPLETED | OUTPATIENT
Start: 2024-06-12 | End: 2024-06-12

## 2024-06-12 RX ORDER — ONDANSETRON 8 MG/1
4 TABLET, FILM COATED ORAL ONCE
Refills: 0 | Status: COMPLETED | OUTPATIENT
Start: 2024-06-12 | End: 2024-06-12

## 2024-06-12 RX ADMIN — ONDANSETRON 4 MILLIGRAM(S): 8 TABLET, FILM COATED ORAL at 21:07

## 2024-06-12 RX ADMIN — FAMOTIDINE 20 MILLIGRAM(S): 10 INJECTION INTRAVENOUS at 21:06

## 2024-06-12 RX ADMIN — Medication 15 MILLIGRAM(S): at 21:16

## 2024-06-12 RX ADMIN — MORPHINE SULFATE 4 MILLIGRAM(S): 50 CAPSULE, EXTENDED RELEASE ORAL at 21:06

## 2024-06-12 RX ADMIN — SODIUM CHLORIDE 1000 MILLILITER(S): 9 INJECTION INTRAMUSCULAR; INTRAVENOUS; SUBCUTANEOUS at 21:06

## 2024-06-12 RX ADMIN — Medication 15 MILLIGRAM(S): at 21:06

## 2024-06-12 RX ADMIN — MORPHINE SULFATE 4 MILLIGRAM(S): 50 CAPSULE, EXTENDED RELEASE ORAL at 21:16

## 2024-06-12 NOTE — ED ADULT NURSE NOTE - OBJECTIVE STATEMENT
47 y/o female Pt present to the ED with c/o left lower flank pain radiation to the back.  Pt is alert and oriented x3. Denies chest pain , vomiting and diarrhea.  Pt has had nausea.  Blood work drawn and sent to lab, 20g IV placed to  Rt AC  , no infiltration noted.  Pt believes she has kidney stones.  No prior history of kidney stones. All pain meds given with good results.  No complaints made at this time Pt made comfortable. All safety precautions maintained.

## 2024-06-12 NOTE — ED PROVIDER NOTE - NSFOLLOWUPINSTRUCTIONS_ED_ALL_ED_FT
Kidney stones are formed when salts, minerals, and other substances normally found in the urine clump together. They can be as small as grains of sand or, rarely, as large as golf balls.    While the stone is travelling through the ureter, which is the tube that carries urine from the kidney to the bladder, you will probably feel pain. The pain may be mild or very severe. You may also have some blood in your urine. As soon as the stone reaches the bladder, any intense pain should go away.    If a stone is too large to pass on its own, you may need a medical procedure to help you pass the stone.    The doctor has checked you carefully, but problems can develop later. If you notice any problems or new symptoms, get medical treatment right away.    Follow-up care is a key part of your treatment and safety.     How can you care for yourself at home?  Drink plenty of fluids. If you have kidney, heart, or liver disease and have to limit fluids, talk with your doctor before you increase the amount of fluids you drink.  Take pain medicines exactly as directed. Call your doctor or nurse advice line if you think you are having a problem with your medicine.  If the doctor gave you a prescription medicine for pain, take it as prescribed.  If you are not taking a prescription pain medicine, ask your doctor if you can take an over-the-counter medicine. Read and follow all instructions on the label.  Your doctor may ask you to strain your urine so that you can collect your kidney stone when it passes. You can use a kitchen strainer or a tea strainer to catch the stone. Store it in a plastic bag until you see your doctor again.

## 2024-06-12 NOTE — ED PROVIDER NOTE - NSFOLLOWUPCLINICS_GEN_ALL_ED_FT
[facility]  Urology  [address] [place] [address]  Phone: [phone]  Fax: [phone]  Follow Up Time: 4-6 Days

## 2024-06-12 NOTE — ED ADULT TRIAGE NOTE - CHIEF COMPLAINT QUOTE
pt reports that she has left flank pain, states that   "SHOOTING PAIN" started last night . pain radiates to back and left thigh  and  feverish  . urine is very dark . denies burning ,frequency of urination  and chills , N/V/D

## 2024-06-12 NOTE — ED ADULT NURSE NOTE - NSFALLUNIVINTERV_ED_ALL_ED
Bed/Stretcher in lowest position, wheels locked, appropriate side rails in place/Call bell, personal items and telephone in reach/Instruct patient to call for assistance before getting out of bed/chair/stretcher/Non-slip footwear applied when patient is off stretcher/Mina to call system/Physically safe environment - no spills, clutter or unnecessary equipment/Purposeful proactive rounding/Room/bathroom lighting operational, light cord in reach

## 2024-06-12 NOTE — ED ADULT NURSE NOTE - CAS EDN DISCHARGE ASSESSMENT
[Initial Consultation] : an initial consultation [Friend] : friend [FreeTextEntry2] : ovarian ca Alert and oriented to person, place and time

## 2024-06-12 NOTE — ED PROVIDER NOTE - PATIENT PORTAL LINK FT
You can access the FollowMyHealth Patient Portal offered by [facility] by registering at the following website: [email or web address]. By joining [email or web address]’s FollowMyHealth portal, you will also be able to view your health information using other applications (apps) compatible with our system.

## 2024-06-12 NOTE — ED PROVIDER NOTE - CLINICAL SUMMARY MEDICAL DECISION MAKING FREE TEXT BOX
After introducing myself to the patient and/or family I have performed a medical history, review of systems, and physical examination. I have formulated a differential diagnosis and plan of care for this visit and discussed this with the patient and/or family. I have also addressed the risks and benefits of diagnostic and treatment modalities planned for this visit.  Vital Signs: Reviewed the patient's vital signs  Nursing Notes: Reviewed and utilized the nursing notes  Old Medical Records: The patient's available past medical records and past encounters were reviewed  Laboratory Studies: Ordered and independently interpreted laboratory test, see above  Imaging Studies: Imaging studies ordered. Radiologist interpretation reviewed. I have independently reviewed imaging.      Presentation most consistent with Renal Colic from aKidney Stone.  Mild UTI noted, patient well appearing, Abx prescribed.    Given History and Exam I have lower suspicion for atypical appendicitis, genital torsion, acute cholecystitis, AAA, Aortic Dissection, Serious Bacterial Illness or other emergent intraabdominal pathology.    Workup: CBC, BMP, CT Abd/Pelvis noncontrast, UA, reassess  Findings: 4mm UVJ stone    Reassesment: Patient tolerating PO and pain controlled  Disposition:  Discharge. Strict return precautions for infected stone or PO intolerance discussed.

## 2024-06-13 RX ORDER — OXYCODONE AND ACETAMINOPHEN 5; 325 MG/1; MG/1
1 TABLET ORAL
Qty: 8 | Refills: 0
Start: 2024-06-13 | End: 2024-06-14

## 2024-06-13 RX ORDER — CEFPODOXIME PROXETIL 100 MG
1 TABLET ORAL
Qty: 20 | Refills: 0
Start: 2024-06-13 | End: 2024-06-22

## 2024-06-13 RX ORDER — IBUPROFEN 200 MG
1 TABLET ORAL
Qty: 42 | Refills: 0
Start: 2024-06-13 | End: 2024-06-26

## 2024-06-13 RX ORDER — TAMSULOSIN HYDROCHLORIDE 0.4 MG/1
1 CAPSULE ORAL
Qty: 7 | Refills: 0
Start: 2024-06-13 | End: 2024-06-19

## 2024-06-14 LAB
CULTURE RESULTS: SIGNIFICANT CHANGE UP
SPECIMEN SOURCE: SIGNIFICANT CHANGE UP
